# Patient Record
Sex: MALE | Race: WHITE | NOT HISPANIC OR LATINO | Employment: OTHER | URBAN - METROPOLITAN AREA
[De-identification: names, ages, dates, MRNs, and addresses within clinical notes are randomized per-mention and may not be internally consistent; named-entity substitution may affect disease eponyms.]

---

## 2023-04-04 LAB
MISCELLANEUOUS TEST RESULT: NORMAL
NAME OF SENDOUT TEST: NORMAL

## 2023-04-20 ENCOUNTER — TELEPHONE (OUTPATIENT)
Dept: PRIMARY CARE | Facility: CLINIC | Age: 63
End: 2023-04-20

## 2023-04-20 NOTE — TELEPHONE ENCOUNTER
He called he would like to know if we could order something he has a fever, dry cough, his pharmacy is [facility] in [place]. He did take a covid test and it was neg. Please call him if we order anything at [phone].

## 2023-05-30 PROBLEM — J30.2 SEASONAL ALLERGIES: Status: ACTIVE | Noted: 2023-05-30

## 2023-05-30 PROBLEM — H61.23 BILATERAL IMPACTED CERUMEN: Status: ACTIVE | Noted: 2023-05-30

## 2023-05-30 PROBLEM — J06.9 URI (UPPER RESPIRATORY INFECTION): Status: ACTIVE | Noted: 2023-05-30

## 2023-05-30 PROBLEM — J30.9 ALLERGIC RHINITIS: Status: ACTIVE | Noted: 2023-05-30

## 2023-05-30 PROBLEM — M06.9 RHEUMATOID ARTHRITIS (MULTI): Status: ACTIVE | Noted: 2023-05-30

## 2023-05-30 PROBLEM — E78.5 HYPERLIPEMIA: Status: ACTIVE | Noted: 2023-05-30

## 2023-05-30 PROBLEM — L30.9 ECZEMA: Status: ACTIVE | Noted: 2023-05-30

## 2023-05-30 PROBLEM — K64.9 HEMORRHOIDS: Status: ACTIVE | Noted: 2023-05-30

## 2023-05-30 PROBLEM — H60.90 OTITIS EXTERNA: Status: ACTIVE | Noted: 2023-05-30

## 2023-05-30 PROBLEM — R35.1 NOCTURIA: Status: ACTIVE | Noted: 2023-05-30

## 2023-05-30 PROBLEM — I10 BENIGN ESSENTIAL HYPERTENSION: Status: ACTIVE | Noted: 2023-05-30

## 2023-05-30 PROBLEM — L80 VITILIGO: Status: ACTIVE | Noted: 2023-05-30

## 2023-05-30 PROBLEM — E78.00 LOW-DENSITY-LIPOID-TYPE (LDL) HYPERLIPOPROTEINEMIA: Status: ACTIVE | Noted: 2023-05-30

## 2023-05-30 PROBLEM — U07.1 COVID-19: Status: ACTIVE | Noted: 2023-05-30

## 2023-05-30 RX ORDER — HYDROXYCHLOROQUINE SULFATE 200 MG/1
1 TABLET, FILM COATED ORAL EVERY OTHER DAY
COMMUNITY
End: 2023-11-13 | Stop reason: SDUPTHER

## 2023-05-30 RX ORDER — MINERAL OIL
1 ENEMA (ML) RECTAL DAILY PRN
COMMUNITY
End: 2024-02-28 | Stop reason: ALTCHOICE

## 2023-05-30 RX ORDER — IBUPROFEN 200 MG
200 TABLET ORAL
COMMUNITY
Start: 2022-05-31

## 2023-05-30 RX ORDER — FLUTICASONE PROPIONATE 50 MCG
2 SPRAY, SUSPENSION (ML) NASAL DAILY
COMMUNITY

## 2023-05-31 ENCOUNTER — OFFICE VISIT (OUTPATIENT)
Dept: PRIMARY CARE | Facility: CLINIC | Age: 63
End: 2023-05-31
Payer: COMMERCIAL

## 2023-05-31 VITALS
DIASTOLIC BLOOD PRESSURE: 84 MMHG | HEART RATE: 61 BPM | WEIGHT: 155 LBS | BODY MASS INDEX: 24.91 KG/M2 | SYSTOLIC BLOOD PRESSURE: 150 MMHG | HEIGHT: 66 IN

## 2023-05-31 DIAGNOSIS — Z76.89 ENCOUNTER TO ESTABLISH CARE: ICD-10-CM

## 2023-05-31 DIAGNOSIS — H61.23 BILATERAL IMPACTED CERUMEN: ICD-10-CM

## 2023-05-31 DIAGNOSIS — E78.00 LOW-DENSITY-LIPOID-TYPE (LDL) HYPERLIPOPROTEINEMIA: ICD-10-CM

## 2023-05-31 DIAGNOSIS — M06.9 RHEUMATOID ARTHRITIS, INVOLVING UNSPECIFIED SITE, UNSPECIFIED WHETHER RHEUMATOID FACTOR PRESENT (MULTI): ICD-10-CM

## 2023-05-31 DIAGNOSIS — E78.2 MIXED HYPERLIPIDEMIA: Primary | ICD-10-CM

## 2023-05-31 DIAGNOSIS — M54.2 NECK PAIN: ICD-10-CM

## 2023-05-31 DIAGNOSIS — I10 BENIGN ESSENTIAL HYPERTENSION: ICD-10-CM

## 2023-05-31 DIAGNOSIS — J30.2 SEASONAL ALLERGIES: ICD-10-CM

## 2023-05-31 DIAGNOSIS — Z12.5 PROSTATE CANCER SCREENING: ICD-10-CM

## 2023-05-31 PROBLEM — J06.9 URI (UPPER RESPIRATORY INFECTION): Status: RESOLVED | Noted: 2023-05-30 | Resolved: 2023-05-31

## 2023-05-31 PROBLEM — H60.90 OTITIS EXTERNA: Status: RESOLVED | Noted: 2023-05-30 | Resolved: 2023-05-31

## 2023-05-31 PROBLEM — U07.1 COVID-19: Status: RESOLVED | Noted: 2023-05-30 | Resolved: 2023-05-31

## 2023-05-31 PROCEDURE — 3079F DIAST BP 80-89 MM HG: CPT | Performed by: CLINICAL NURSE SPECIALIST

## 2023-05-31 PROCEDURE — 3077F SYST BP >= 140 MM HG: CPT | Performed by: CLINICAL NURSE SPECIALIST

## 2023-05-31 PROCEDURE — 99214 OFFICE O/P EST MOD 30 MIN: CPT | Performed by: CLINICAL NURSE SPECIALIST

## 2023-05-31 PROCEDURE — 1036F TOBACCO NON-USER: CPT | Performed by: CLINICAL NURSE SPECIALIST

## 2023-05-31 RX ORDER — LISINOPRIL 10 MG/1
10 TABLET ORAL DAILY
Qty: 30 TABLET | Refills: 5 | Status: SHIPPED | OUTPATIENT
Start: 2023-05-31 | End: 2023-07-05

## 2023-05-31 ASSESSMENT — ENCOUNTER SYMPTOMS
NECK PAIN: 1
CHILLS: 0
BRUISES/BLEEDS EASILY: 0
FATIGUE: 0
POLYDIPSIA: 0
COUGH: 0
FEVER: 0
APPETITE CHANGE: 0
UNEXPECTED WEIGHT CHANGE: 0
DIARRHEA: 0
PHOTOPHOBIA: 0
HEMATURIA: 0
DYSURIA: 0
TROUBLE SWALLOWING: 0
WOUND: 0
CHEST TIGHTNESS: 0
HEADACHES: 0
LOSS OF SENSATION IN FEET: 0
VOMITING: 0
FLANK PAIN: 0
JOINT SWELLING: 1
EYE PAIN: 0
SEIZURES: 0
SORE THROAT: 0
OCCASIONAL FEELINGS OF UNSTEADINESS: 0
WHEEZING: 0
NAUSEA: 0
ARTHRALGIAS: 1
CONSTIPATION: 0
DEPRESSION: 0
SHORTNESS OF BREATH: 0
SLEEP DISTURBANCE: 0
CONFUSION: 0
MYALGIAS: 0
ABDOMINAL PAIN: 0
BLOOD IN STOOL: 0
ACTIVITY CHANGE: 0
BACK PAIN: 0
DIZZINESS: 0
PALPITATIONS: 0

## 2023-05-31 ASSESSMENT — COLUMBIA-SUICIDE SEVERITY RATING SCALE - C-SSRS
2. HAVE YOU ACTUALLY HAD ANY THOUGHTS OF KILLING YOURSELF?: NO
1. IN THE PAST MONTH, HAVE YOU WISHED YOU WERE DEAD OR WISHED YOU COULD GO TO SLEEP AND NOT WAKE UP?: NO
6. HAVE YOU EVER DONE ANYTHING, STARTED TO DO ANYTHING, OR PREPARED TO DO ANYTHING TO END YOUR LIFE?: NO

## 2023-05-31 ASSESSMENT — PATIENT HEALTH QUESTIONNAIRE - PHQ9
SUM OF ALL RESPONSES TO PHQ9 QUESTIONS 1 AND 2: 0
2. FEELING DOWN, DEPRESSED OR HOPELESS: NOT AT ALL
1. LITTLE INTEREST OR PLEASURE IN DOING THINGS: NOT AT ALL

## 2023-05-31 NOTE — PROGRESS NOTES
"Subjective   Patient ID: Dylon Louise is a 62 y.o. male who presents for Annual Exam (Wellness exam/Transfer from [facility] ).  HPI    New patient here today to establish care.  Transfer care from [facility].      Following with Rheumatologist, Dr. Abraham. Had Vectra testing done, was recommended to take every other day. Diagnosed in 2011. Feels that his pain is controlled, improved \"achy joints.' Diagnosed with Gunn's Neuroma. Taking Ibuprofen OTC and well fitted shoes.     Taking Allegra, Flonase, and Ibuprofen PRN. Allergy testing in the past, indicated positive for Red Maple Pollen, worse with Dust Mites. Worse in the Spring/Fall. Otherwise, normally well controlled.     Hypertension, has not been on any medications in the past. Cautious about starting new medications. Has been trying a healthier diet, part meat/vegetarian diet. Diet/Exercise. B12. Men's Multivitamin to help.     Left ear. Feels that the ear is \"closed up.\" Yawning will noticed some improvement but feels that he needs to continually do. Not having the discomfort/drainage.     Right sided neck pain. Has tried stretching exercises. Aggravated after moving his son. Still feels tightness. Denies any radiation of pain. Denies any numbness/tingling.     Review of Systems   Constitutional:  Negative for activity change, appetite change, chills, fatigue, fever and unexpected weight change.   HENT:  Negative for ear pain, hearing loss, nosebleeds, sore throat, tinnitus and trouble swallowing.    Eyes:  Negative for photophobia, pain and visual disturbance.   Respiratory:  Negative for cough, chest tightness, shortness of breath and wheezing.    Cardiovascular:  Negative for chest pain, palpitations and leg swelling.   Gastrointestinal:  Negative for abdominal pain, blood in stool, constipation, diarrhea, nausea and vomiting.   Endocrine: Negative for cold intolerance, heat intolerance, polydipsia and polyuria.   Genitourinary:  " Negative for dysuria, flank pain and hematuria.   Musculoskeletal:  Positive for arthralgias, joint swelling and neck pain. Negative for back pain and myalgias.   Skin:  Negative for pallor, rash and wound.   Allergic/Immunologic: Negative for immunocompromised state.   Neurological:  Negative for dizziness, seizures and headaches.   Hematological:  Does not bruise/bleed easily.   Psychiatric/Behavioral:  Negative for confusion and sleep disturbance.        Objective   Physical Exam  Vitals and nursing note reviewed.   Constitutional:       General: He is not in acute distress.     Appearance: Normal appearance.   HENT:      Head: Normocephalic.      Nose: Nose normal.   Eyes:      Conjunctiva/sclera: Conjunctivae normal.   Neck:      Vascular: No carotid bruit.   Cardiovascular:      Rate and Rhythm: Normal rate and regular rhythm.      Pulses: Normal pulses.      Heart sounds: Normal heart sounds.   Pulmonary:      Effort: Pulmonary effort is normal.      Breath sounds: Normal breath sounds.   Abdominal:      General: Bowel sounds are normal.      Palpations: Abdomen is soft.   Musculoskeletal:         General: Normal range of motion.      Cervical back: Normal range of motion.   Skin:     General: Skin is warm and dry.   Neurological:      Mental Status: He is alert and oriented to person, place, and time. Mental status is at baseline.   Psychiatric:         Mood and Affect: Mood normal.         Behavior: Behavior normal.       Assessment/Plan       New order for blood work provided for patient today.     Hypertension: Blood pressure uncontrolled at OV today. Working on dietary changes with minimal improvement. Lisinopril Follow up in 1 month.   Elevated LDL: Updated lab work ordered. CT Cardiac Scoring ordered.   RA: Continue Plaquenil.  Following with Rheumatologist.   Cerumen Impaction: Irrigation performed at OV today.   Neck Pain: Discussed warm compresses, massage, stretching exercises. Reevaluate at follow  up appointment.   Allergies: Controlled with OTC medications. Continue to monitor.      Colonoscopy: December 2014, plan to repeat in 10 years.   Prostate Screening ordered.   Tdap: June 2016.   Shingrix: September 2020, January 2021.   COVID: September 2022.

## 2023-06-01 ENCOUNTER — LAB (OUTPATIENT)
Dept: LAB | Facility: LAB | Age: 63
End: 2023-06-01
Payer: COMMERCIAL

## 2023-06-01 DIAGNOSIS — M06.9 RHEUMATOID ARTHRITIS, INVOLVING UNSPECIFIED SITE, UNSPECIFIED WHETHER RHEUMATOID FACTOR PRESENT (MULTI): ICD-10-CM

## 2023-06-01 DIAGNOSIS — E78.2 MIXED HYPERLIPIDEMIA: ICD-10-CM

## 2023-06-01 DIAGNOSIS — I10 BENIGN ESSENTIAL HYPERTENSION: ICD-10-CM

## 2023-06-01 DIAGNOSIS — Z12.5 PROSTATE CANCER SCREENING: ICD-10-CM

## 2023-06-01 LAB
ALANINE AMINOTRANSFERASE (SGPT) (U/L) IN SER/PLAS: 21 U/L (ref 10–52)
ALBUMIN (G/DL) IN SER/PLAS: 3.9 G/DL (ref 3.4–5)
ALKALINE PHOSPHATASE (U/L) IN SER/PLAS: 94 U/L (ref 33–136)
ANION GAP IN SER/PLAS: 10 MMOL/L (ref 10–20)
ASPARTATE AMINOTRANSFERASE (SGOT) (U/L) IN SER/PLAS: 22 U/L (ref 9–39)
BILIRUBIN TOTAL (MG/DL) IN SER/PLAS: 0.8 MG/DL (ref 0–1.2)
CALCIUM (MG/DL) IN SER/PLAS: 9.4 MG/DL (ref 8.6–10.3)
CARBON DIOXIDE, TOTAL (MMOL/L) IN SER/PLAS: 32 MMOL/L (ref 21–32)
CHLORIDE (MMOL/L) IN SER/PLAS: 104 MMOL/L (ref 98–107)
CHOLESTEROL (MG/DL) IN SER/PLAS: 163 MG/DL (ref 0–199)
CHOLESTEROL IN HDL (MG/DL) IN SER/PLAS: 39.3 MG/DL
CHOLESTEROL/HDL RATIO: 4.1
COBALAMIN (VITAMIN B12) (PG/ML) IN SER/PLAS: 827 PG/ML (ref 211–911)
CREATININE (MG/DL) IN SER/PLAS: 0.95 MG/DL (ref 0.5–1.3)
ERYTHROCYTE DISTRIBUTION WIDTH (RATIO) BY AUTOMATED COUNT: 12.9 % (ref 11.5–14.5)
ERYTHROCYTE MEAN CORPUSCULAR HEMOGLOBIN CONCENTRATION (G/DL) BY AUTOMATED: 33.3 G/DL (ref 32–36)
ERYTHROCYTE MEAN CORPUSCULAR VOLUME (FL) BY AUTOMATED COUNT: 87 FL (ref 80–100)
ERYTHROCYTES (10*6/UL) IN BLOOD BY AUTOMATED COUNT: 5.1 X10E12/L (ref 4.5–5.9)
GFR MALE: 90 ML/MIN/1.73M2
GLUCOSE (MG/DL) IN SER/PLAS: 81 MG/DL (ref 74–99)
HEMATOCRIT (%) IN BLOOD BY AUTOMATED COUNT: 44.4 % (ref 41–52)
HEMOGLOBIN (G/DL) IN BLOOD: 14.8 G/DL (ref 13.5–17.5)
LDL: 103 MG/DL (ref 0–99)
LEUKOCYTES (10*3/UL) IN BLOOD BY AUTOMATED COUNT: 5.8 X10E9/L (ref 4.4–11.3)
PLATELETS (10*3/UL) IN BLOOD AUTOMATED COUNT: 191 X10E9/L (ref 150–450)
POTASSIUM (MMOL/L) IN SER/PLAS: 3.9 MMOL/L (ref 3.5–5.3)
PROSTATE SPECIFIC ANTIGEN,SCREEN: 1.19 NG/ML (ref 0–4)
PROTEIN TOTAL: 6.4 G/DL (ref 6.4–8.2)
SODIUM (MMOL/L) IN SER/PLAS: 142 MMOL/L (ref 136–145)
THYROTROPIN (MIU/L) IN SER/PLAS BY DETECTION LIMIT <= 0.05 MIU/L: 3.34 MIU/L (ref 0.44–3.98)
TRIGLYCERIDE (MG/DL) IN SER/PLAS: 103 MG/DL (ref 0–149)
UREA NITROGEN (MG/DL) IN SER/PLAS: 12 MG/DL (ref 6–23)
VLDL: 21 MG/DL (ref 0–40)

## 2023-06-01 PROCEDURE — 84153 ASSAY OF PSA TOTAL: CPT

## 2023-06-01 PROCEDURE — 84443 ASSAY THYROID STIM HORMONE: CPT

## 2023-06-01 PROCEDURE — 36415 COLL VENOUS BLD VENIPUNCTURE: CPT

## 2023-06-01 PROCEDURE — 85027 COMPLETE CBC AUTOMATED: CPT

## 2023-06-01 PROCEDURE — 80061 LIPID PANEL: CPT

## 2023-06-01 PROCEDURE — 80053 COMPREHEN METABOLIC PANEL: CPT

## 2023-06-01 PROCEDURE — 82607 VITAMIN B-12: CPT

## 2023-06-05 ENCOUNTER — TELEPHONE (OUTPATIENT)
Dept: PRIMARY CARE | Facility: CLINIC | Age: 63
End: 2023-06-05

## 2023-06-05 DIAGNOSIS — I10 BENIGN ESSENTIAL HYPERTENSION: ICD-10-CM

## 2023-06-05 NOTE — TELEPHONE ENCOUNTER
----- Message from RUBIO Bravo-CNS sent at 6/2/2023 10:53 PM EDT -----  Please let patient know that his blood work is controlled. No changes needed at this time. Plan to repeat CMP prior to follow up appointment. Thank you!

## 2023-06-13 ENCOUNTER — LAB (OUTPATIENT)
Dept: LAB | Facility: LAB | Age: 63
End: 2023-06-13
Payer: COMMERCIAL

## 2023-06-13 DIAGNOSIS — I10 BENIGN ESSENTIAL HYPERTENSION: ICD-10-CM

## 2023-06-13 LAB
ALANINE AMINOTRANSFERASE (SGPT) (U/L) IN SER/PLAS: 23 U/L (ref 10–52)
ALBUMIN (G/DL) IN SER/PLAS: 4.2 G/DL (ref 3.4–5)
ALKALINE PHOSPHATASE (U/L) IN SER/PLAS: 90 U/L (ref 33–136)
ANION GAP IN SER/PLAS: 10 MMOL/L (ref 10–20)
ASPARTATE AMINOTRANSFERASE (SGOT) (U/L) IN SER/PLAS: 23 U/L (ref 9–39)
BILIRUBIN TOTAL (MG/DL) IN SER/PLAS: 0.9 MG/DL (ref 0–1.2)
CALCIUM (MG/DL) IN SER/PLAS: 9.3 MG/DL (ref 8.6–10.3)
CARBON DIOXIDE, TOTAL (MMOL/L) IN SER/PLAS: 31 MMOL/L (ref 21–32)
CHLORIDE (MMOL/L) IN SER/PLAS: 106 MMOL/L (ref 98–107)
CREATININE (MG/DL) IN SER/PLAS: 0.84 MG/DL (ref 0.5–1.3)
GFR MALE: >90 ML/MIN/1.73M2
GLUCOSE (MG/DL) IN SER/PLAS: 104 MG/DL (ref 74–99)
POTASSIUM (MMOL/L) IN SER/PLAS: 4.2 MMOL/L (ref 3.5–5.3)
PROTEIN TOTAL: 6.5 G/DL (ref 6.4–8.2)
SODIUM (MMOL/L) IN SER/PLAS: 143 MMOL/L (ref 136–145)
UREA NITROGEN (MG/DL) IN SER/PLAS: 14 MG/DL (ref 6–23)

## 2023-06-13 PROCEDURE — 36415 COLL VENOUS BLD VENIPUNCTURE: CPT

## 2023-06-13 PROCEDURE — 80053 COMPREHEN METABOLIC PANEL: CPT

## 2023-06-13 NOTE — TELEPHONE ENCOUNTER
----- Message from RUBIO Bravo-CNS sent at 6/12/2023 10:53 PM EDT -----  Please let patient know that CT Cardiac Scoring is 0. Incidental findings he will see on the Portal we can discuss at follow up appointment further. Thank you!

## 2023-07-05 ENCOUNTER — OFFICE VISIT (OUTPATIENT)
Dept: PRIMARY CARE | Facility: CLINIC | Age: 63
End: 2023-07-05
Payer: COMMERCIAL

## 2023-07-05 VITALS
WEIGHT: 158 LBS | HEIGHT: 66 IN | DIASTOLIC BLOOD PRESSURE: 84 MMHG | HEART RATE: 61 BPM | BODY MASS INDEX: 25.39 KG/M2 | SYSTOLIC BLOOD PRESSURE: 150 MMHG

## 2023-07-05 DIAGNOSIS — I10 BENIGN ESSENTIAL HYPERTENSION: ICD-10-CM

## 2023-07-05 DIAGNOSIS — Z12.5 PROSTATE CANCER SCREENING: ICD-10-CM

## 2023-07-05 DIAGNOSIS — R93.1 ABNORMAL HEART SCORE CT: ICD-10-CM

## 2023-07-05 DIAGNOSIS — M06.9 RHEUMATOID ARTHRITIS, INVOLVING UNSPECIFIED SITE, UNSPECIFIED WHETHER RHEUMATOID FACTOR PRESENT (MULTI): ICD-10-CM

## 2023-07-05 DIAGNOSIS — J30.9 ALLERGIC RHINITIS, UNSPECIFIED SEASONALITY, UNSPECIFIED TRIGGER: Primary | ICD-10-CM

## 2023-07-05 DIAGNOSIS — E78.2 MIXED HYPERLIPIDEMIA: ICD-10-CM

## 2023-07-05 PROBLEM — H61.23 BILATERAL IMPACTED CERUMEN: Status: RESOLVED | Noted: 2023-05-30 | Resolved: 2023-07-05

## 2023-07-05 PROCEDURE — 3077F SYST BP >= 140 MM HG: CPT | Performed by: CLINICAL NURSE SPECIALIST

## 2023-07-05 PROCEDURE — 3079F DIAST BP 80-89 MM HG: CPT | Performed by: CLINICAL NURSE SPECIALIST

## 2023-07-05 PROCEDURE — 99214 OFFICE O/P EST MOD 30 MIN: CPT | Performed by: CLINICAL NURSE SPECIALIST

## 2023-07-05 PROCEDURE — 1036F TOBACCO NON-USER: CPT | Performed by: CLINICAL NURSE SPECIALIST

## 2023-07-05 RX ORDER — LISINOPRIL 20 MG/1
20 TABLET ORAL DAILY
Qty: 90 TABLET | Refills: 3 | Status: SHIPPED | OUTPATIENT
Start: 2023-07-05 | End: 2023-07-06 | Stop reason: SDUPTHER

## 2023-07-05 RX ORDER — LISINOPRIL 20 MG/1
20 TABLET ORAL DAILY
Qty: 30 TABLET | Refills: 11 | Status: SHIPPED | OUTPATIENT
Start: 2023-07-05 | End: 2023-07-05 | Stop reason: SDUPTHER

## 2023-07-05 ASSESSMENT — ENCOUNTER SYMPTOMS
DEPRESSION: 0
BLOOD IN STOOL: 0
FATIGUE: 0
CHILLS: 0
CHEST TIGHTNESS: 0
BACK PAIN: 0
APPETITE CHANGE: 0
POLYDIPSIA: 0
ABDOMINAL PAIN: 0
CONSTIPATION: 0
VOMITING: 0
COUGH: 0
SHORTNESS OF BREATH: 0
BRUISES/BLEEDS EASILY: 0
NECK PAIN: 0
ARTHRALGIAS: 1
TROUBLE SWALLOWING: 0
FLANK PAIN: 0
HEMATURIA: 0
SORE THROAT: 0
MYALGIAS: 0
SLEEP DISTURBANCE: 0
PHOTOPHOBIA: 0
EYE PAIN: 0
HEADACHES: 0
LOSS OF SENSATION IN FEET: 0
FEVER: 0
UNEXPECTED WEIGHT CHANGE: 0
JOINT SWELLING: 0
WHEEZING: 0
PALPITATIONS: 0
OCCASIONAL FEELINGS OF UNSTEADINESS: 0
DIZZINESS: 0
DYSURIA: 0
CONFUSION: 0
SEIZURES: 0
NAUSEA: 0
WOUND: 0
DIARRHEA: 0
ACTIVITY CHANGE: 0

## 2023-07-05 ASSESSMENT — COLUMBIA-SUICIDE SEVERITY RATING SCALE - C-SSRS
2. HAVE YOU ACTUALLY HAD ANY THOUGHTS OF KILLING YOURSELF?: NO
6. HAVE YOU EVER DONE ANYTHING, STARTED TO DO ANYTHING, OR PREPARED TO DO ANYTHING TO END YOUR LIFE?: NO
1. IN THE PAST MONTH, HAVE YOU WISHED YOU WERE DEAD OR WISHED YOU COULD GO TO SLEEP AND NOT WAKE UP?: NO

## 2023-07-05 ASSESSMENT — PATIENT HEALTH QUESTIONNAIRE - PHQ9
2. FEELING DOWN, DEPRESSED OR HOPELESS: NOT AT ALL
SUM OF ALL RESPONSES TO PHQ9 QUESTIONS 1 AND 2: 0
1. LITTLE INTEREST OR PLEASURE IN DOING THINGS: NOT AT ALL

## 2023-07-05 NOTE — PROGRESS NOTES
"Subjective   Patient ID: Dylon Louise is a 62 y.o. male who presents for Follow-up (Follow up htn /Review CT scan and discuss feet).  HPI    Here today as a follow up appointment.     Following with Rheumatologist, Dr. Abraham. Had Vectra testing done, was recommended to take every other day. Diagnosed in 2011. Feels that his pain is controlled, improved \"achy joints.' Diagnosed with Gunn's Neuroma. Taking Ibuprofen OTC and well fitted shoes. Complaints of worsening pain in his right foot. Planning to consider intervention. Considering a Specialist for evaluation.      Taking Allegra, Flonase, and Ibuprofen PRN. Allergy testing in the past, indicated positive for Red Maple Pollen, worse with Dust Mites. Worse in the Spring/Fall. Otherwise, normally well controlled.      Hypertension, started on Lisinopril after last OV. Tolerating medication. Some improvement but blood pressure has remained elevated. Has been trying a healthier diet, part meat/vegetarian diet. Diet/Exercise. B12. Men's Multivitamin to help.      Right sided neck pain. Has tried stretching exercises. Aggravated after moving his son. Still feels tightness. Denies any radiation of pain. Denies any numbness/tingling.     Review of Systems   Constitutional:  Negative for activity change, appetite change, chills, fatigue, fever and unexpected weight change.   HENT:  Negative for ear pain, hearing loss, nosebleeds, sore throat, tinnitus and trouble swallowing.    Eyes:  Negative for photophobia, pain and visual disturbance.   Respiratory:  Negative for cough, chest tightness, shortness of breath and wheezing.    Cardiovascular:  Negative for chest pain, palpitations and leg swelling.   Gastrointestinal:  Negative for abdominal pain, blood in stool, constipation, diarrhea, nausea and vomiting.   Endocrine: Negative for cold intolerance, heat intolerance, polydipsia and polyuria.   Genitourinary:  Negative for dysuria, flank pain and hematuria. "   Musculoskeletal:  Positive for arthralgias. Negative for back pain, joint swelling, myalgias and neck pain.   Skin:  Negative for pallor, rash and wound.   Allergic/Immunologic: Negative for immunocompromised state.   Neurological:  Negative for dizziness, seizures and headaches.   Hematological:  Does not bruise/bleed easily.   Psychiatric/Behavioral:  Negative for confusion and sleep disturbance.        Objective   Physical Exam  Constitutional:       General: He is not in acute distress.     Appearance: Normal appearance.   HENT:      Head: Normocephalic.      Nose: Nose normal.   Eyes:      Conjunctiva/sclera: Conjunctivae normal.   Neck:      Vascular: No carotid bruit.   Cardiovascular:      Rate and Rhythm: Normal rate and regular rhythm.      Pulses: Normal pulses.      Heart sounds: Normal heart sounds.   Pulmonary:      Effort: Pulmonary effort is normal.      Breath sounds: Normal breath sounds.   Abdominal:      General: Bowel sounds are normal.      Palpations: Abdomen is soft.   Musculoskeletal:         General: Normal range of motion.      Cervical back: Normal range of motion.   Skin:     General: Skin is warm and dry.   Neurological:      Mental Status: He is alert and oriented to person, place, and time. Mental status is at baseline.   Psychiatric:         Mood and Affect: Mood normal.         Behavior: Behavior normal.         Assessment/Plan       Reviewed results of blood work completed with patient.      Hypertension: Blood pressure uncontrolled at OV today. Working on dietary changes with minimal improvement. Increase Lisinopril Follow up in 1 month.   Elevated LDL: CT Cardiac Scoring: June 2023, Score 0. Lifestyle changes as noted.   RA: Continue Plaquenil.  Following with Rheumatologist.   Neck Pain: Discussed warm compresses, massage, stretching exercises. Reevaluate at follow up appointment.   Allergies: Controlled with OTC medications. Continue to monitor.   Abnormal CT: Follow up  imaging ordered.     Colonoscopy: December 2014, plan to repeat in 10 years.   Prostate Screening: June 2023.   Tdap: June 2016.   Shingrix: September 2020, January 2021.   COVID: September 2022. Discussed Booster.

## 2023-07-06 ENCOUNTER — TELEPHONE (OUTPATIENT)
Dept: PRIMARY CARE | Facility: CLINIC | Age: 63
End: 2023-07-06

## 2023-07-06 DIAGNOSIS — I10 BENIGN ESSENTIAL HYPERTENSION: ICD-10-CM

## 2023-07-06 RX ORDER — LISINOPRIL 20 MG/1
20 TABLET ORAL DAILY
Qty: 14 TABLET | Refills: 0 | Status: SHIPPED | OUTPATIENT
Start: 2023-07-06 | End: 2023-08-24

## 2023-07-06 RX ORDER — LISINOPRIL 20 MG/1
20 TABLET ORAL DAILY
Qty: 30 TABLET | Refills: 0 | Status: SHIPPED | OUTPATIENT
Start: 2023-07-06 | End: 2023-07-06 | Stop reason: SDUPTHER

## 2023-07-06 NOTE — TELEPHONE ENCOUNTER
He called back cause his Lisinopril got cancel that was to be sent to [facility] on [facility] could we please resend it over for just a 30 day his mail order is to come on the 7/14

## 2023-07-24 ENCOUNTER — TELEPHONE (OUTPATIENT)
Dept: PRIMARY CARE | Facility: CLINIC | Age: 63
End: 2023-07-24

## 2023-07-24 NOTE — TELEPHONE ENCOUNTER
----- Message from RUBIO Bravo-CNS sent at 7/23/2023  9:28 PM EDT -----  Please let patient know his CT looks stable. Can discuss further at follow up appointment next month. Thank you!

## 2023-08-24 ENCOUNTER — OFFICE VISIT (OUTPATIENT)
Dept: PRIMARY CARE | Facility: CLINIC | Age: 63
End: 2023-08-24
Payer: COMMERCIAL

## 2023-08-24 VITALS
SYSTOLIC BLOOD PRESSURE: 124 MMHG | HEART RATE: 57 BPM | DIASTOLIC BLOOD PRESSURE: 90 MMHG | BODY MASS INDEX: 25.23 KG/M2 | HEIGHT: 66 IN | WEIGHT: 157 LBS

## 2023-08-24 DIAGNOSIS — E78.2 MIXED HYPERLIPIDEMIA: Primary | ICD-10-CM

## 2023-08-24 DIAGNOSIS — I10 BENIGN ESSENTIAL HYPERTENSION: ICD-10-CM

## 2023-08-24 PROCEDURE — 3074F SYST BP LT 130 MM HG: CPT | Performed by: CLINICAL NURSE SPECIALIST

## 2023-08-24 PROCEDURE — 3080F DIAST BP >= 90 MM HG: CPT | Performed by: CLINICAL NURSE SPECIALIST

## 2023-08-24 PROCEDURE — 99214 OFFICE O/P EST MOD 30 MIN: CPT | Performed by: CLINICAL NURSE SPECIALIST

## 2023-08-24 PROCEDURE — 1036F TOBACCO NON-USER: CPT | Performed by: CLINICAL NURSE SPECIALIST

## 2023-08-24 RX ORDER — LISINOPRIL 40 MG/1
40 TABLET ORAL DAILY
Qty: 90 TABLET | Refills: 3 | Status: SHIPPED | OUTPATIENT
Start: 2023-08-24 | End: 2024-04-17 | Stop reason: SDUPTHER

## 2023-08-24 ASSESSMENT — ENCOUNTER SYMPTOMS
CHEST TIGHTNESS: 0
BACK PAIN: 0
FLANK PAIN: 0
ACTIVITY CHANGE: 0
WHEEZING: 0
OCCASIONAL FEELINGS OF UNSTEADINESS: 0
NECK PAIN: 0
APPETITE CHANGE: 0
FEVER: 0
JOINT SWELLING: 0
WOUND: 0
CHILLS: 0
DIARRHEA: 0
SORE THROAT: 0
ARTHRALGIAS: 1
DIZZINESS: 0
COUGH: 0
NAUSEA: 0
VOMITING: 0
UNEXPECTED WEIGHT CHANGE: 0
ABDOMINAL PAIN: 0
DEPRESSION: 0
POLYDIPSIA: 0
TROUBLE SWALLOWING: 0
HEMATURIA: 0
PALPITATIONS: 0
CONFUSION: 0
BRUISES/BLEEDS EASILY: 0
FATIGUE: 0
HEADACHES: 0
BLOOD IN STOOL: 0
SEIZURES: 0
MYALGIAS: 0
LOSS OF SENSATION IN FEET: 0
PHOTOPHOBIA: 0
SLEEP DISTURBANCE: 0
CONSTIPATION: 0
DYSURIA: 0
SHORTNESS OF BREATH: 0
EYE PAIN: 0

## 2023-08-24 ASSESSMENT — COLUMBIA-SUICIDE SEVERITY RATING SCALE - C-SSRS
6. HAVE YOU EVER DONE ANYTHING, STARTED TO DO ANYTHING, OR PREPARED TO DO ANYTHING TO END YOUR LIFE?: NO
1. IN THE PAST MONTH, HAVE YOU WISHED YOU WERE DEAD OR WISHED YOU COULD GO TO SLEEP AND NOT WAKE UP?: NO
2. HAVE YOU ACTUALLY HAD ANY THOUGHTS OF KILLING YOURSELF?: NO

## 2023-08-24 NOTE — PROGRESS NOTES
"Subjective   Patient ID: Dylon Louise is a 63 y.o. male who presents for Follow-up (1 month follow up htn ).  HPI    Here today as a follow up appointment.      Following with Rheumatologist, Dr. Abraham. Had Vectra testing done, was recommended to take every other day. Diagnosed in 2011. Feels that his pain is controlled, improved \"achy joints.' Diagnosed with Gunn's Neuroma. Taking Ibuprofen OTC and well fitted shoes. Complaints of worsening pain in his right foot. Planning to consider intervention. Now following with Dr. Good for management, planning Orthotics.      Taking Allegra, Flonase, and Ibuprofen PRN. Allergy testing in the past, indicated positive for Red Maple Pollen, worse with Dust Mites. Worse in the Spring/Fall. Otherwise, normally well controlled.      Hypertension, started on Lisinopril after previous OV, increased. Tolerating medication. Some improvement but blood pressure has remained elevated. Has been trying a healthier diet, part meat/vegetarian diet. Diet/Exercise. B12. Men's Multivitamin to help.      Review of Systems   Constitutional:  Negative for activity change, appetite change, chills, fatigue, fever and unexpected weight change.   HENT:  Negative for ear pain, hearing loss, nosebleeds, sore throat, tinnitus and trouble swallowing.    Eyes:  Negative for photophobia, pain and visual disturbance.   Respiratory:  Negative for cough, chest tightness, shortness of breath and wheezing.    Cardiovascular:  Negative for chest pain, palpitations and leg swelling.   Gastrointestinal:  Negative for abdominal pain, blood in stool, constipation, diarrhea, nausea and vomiting.   Endocrine: Negative for cold intolerance, heat intolerance, polydipsia and polyuria.   Genitourinary:  Negative for dysuria, flank pain and hematuria.   Musculoskeletal:  Positive for arthralgias. Negative for back pain, joint swelling, myalgias and neck pain.   Skin:  Negative for pallor, rash and wound. "   Allergic/Immunologic: Negative for immunocompromised state.   Neurological:  Negative for dizziness, seizures and headaches.   Hematological:  Does not bruise/bleed easily.   Psychiatric/Behavioral:  Negative for confusion and sleep disturbance.        Objective   Physical Exam  Vitals and nursing note reviewed.   Constitutional:       General: He is not in acute distress.     Appearance: Normal appearance.   HENT:      Head: Normocephalic.      Nose: Nose normal.   Eyes:      Conjunctiva/sclera: Conjunctivae normal.   Neck:      Vascular: No carotid bruit.   Cardiovascular:      Rate and Rhythm: Normal rate and regular rhythm.      Pulses: Normal pulses.      Heart sounds: Normal heart sounds.   Pulmonary:      Effort: Pulmonary effort is normal.      Breath sounds: Normal breath sounds.   Abdominal:      General: Bowel sounds are normal.      Palpations: Abdomen is soft.   Musculoskeletal:         General: Normal range of motion.      Cervical back: Normal range of motion.   Skin:     General: Skin is warm and dry.   Neurological:      Mental Status: He is alert and oriented to person, place, and time. Mental status is at baseline.   Psychiatric:         Mood and Affect: Mood normal.         Behavior: Behavior normal.       Assessment/Plan        Reviewed results of blood work completed with patient. Follow up CT.      Hypertension: Blood pressure uncontrolled at OV today. Working on dietary changes with minimal improvement. Increase Lisinopril. Reevaluate at follow up appointment.   Elevated LDL: CT Cardiac Scoring: June 2023, Score 0. Lifestyle changes as noted.   RA: Continue Plaquenil.  Following with Rheumatologist.   Neck Pain: Discussed warm compresses, massage, stretching exercises. Reevaluate at follow up appointment.   Allergies: Controlled with OTC medications. Continue to monitor.      Colonoscopy: December 2014, plan to repeat in 10 years.   Prostate Screening: June 2023.   Tdap: June 2016.    Shingrix: September 2020, January 2021.   COVID: September 2022. Discussed Booster.

## 2023-09-09 DIAGNOSIS — U07.1 COVID-19: Primary | ICD-10-CM

## 2023-09-09 RX ORDER — NIRMATRELVIR AND RITONAVIR 300-100 MG
3 KIT ORAL 2 TIMES DAILY
Qty: 30 TABLET | Refills: 0 | Status: SHIPPED | OUTPATIENT
Start: 2023-09-09 | End: 2023-09-14

## 2023-10-25 ENCOUNTER — OFFICE VISIT (OUTPATIENT)
Dept: RHEUMATOLOGY | Facility: CLINIC | Age: 63
End: 2023-10-25
Payer: COMMERCIAL

## 2023-10-25 VITALS
HEART RATE: 62 BPM | WEIGHT: 156 LBS | DIASTOLIC BLOOD PRESSURE: 87 MMHG | BODY MASS INDEX: 25.07 KG/M2 | HEIGHT: 66 IN | SYSTOLIC BLOOD PRESSURE: 159 MMHG

## 2023-10-25 DIAGNOSIS — M05.9 RHEUMATOID ARTHRITIS WITH POSITIVE RHEUMATOID FACTOR, INVOLVING UNSPECIFIED SITE (MULTI): Primary | ICD-10-CM

## 2023-10-25 DIAGNOSIS — M81.0 AGE RELATED OSTEOPOROSIS, UNSPECIFIED PATHOLOGICAL FRACTURE PRESENCE: ICD-10-CM

## 2023-10-25 PROCEDURE — 3079F DIAST BP 80-89 MM HG: CPT | Performed by: INTERNAL MEDICINE

## 2023-10-25 PROCEDURE — 99214 OFFICE O/P EST MOD 30 MIN: CPT | Performed by: INTERNAL MEDICINE

## 2023-10-25 PROCEDURE — 1036F TOBACCO NON-USER: CPT | Performed by: INTERNAL MEDICINE

## 2023-10-25 PROCEDURE — 3077F SYST BP >= 140 MM HG: CPT | Performed by: INTERNAL MEDICINE

## 2023-10-25 NOTE — PROGRESS NOTES
Follow-up Rheumatology Patient Visit    Chief Complaint:  Dylon Louise is a 63 y.o. male presenting today for Follow-up.    History of Presenting Problem:   64 y/o male with Hx of seropositive RA diagnosed in 2011 on PLQ present for evaluation. He recalled having a lot of swelling in his hands/feet and fatigue. He was found to have +CC and RF. He was started on PLQ and he responded very well. Since has been on Plaquenil he has minimal joint complaints he has very few joint exacerbations, at last visit he was interested in discussing whether he is in disease remission.  Vectra score March 2023 was 21, so He has been on every other day  mg.   He recently got insert for his feet which improved the pain, left foot was though to be related reynolds's neuroma.    Problem List:   Patient Active Problem List   Diagnosis    Allergic rhinitis    Benign essential hypertension    Eczema    Hemorrhoids    Hyperlipemia    Low-density-lipoid-type (LDL) hyperlipoproteinemia    Nocturia    Rheumatoid arthritis (CMS/HCC)    Seasonal allergies    Vitiligo    Neck pain       Past Medical History:   Past Medical History:   Diagnosis Date    Allergic     Arthritis     Eczema     Hypertension     Other conditions influencing health status     No significant past medical history       Surgical History: No past surgical history on file.     Allergies:   Allergies   Allergen Reactions    Azithromycin Unknown    Pollen Extracts Unknown    Tobramycin Other and Rash       Medications:   Current Outpatient Medications:     fexofenadine (Allegra) 180 mg tablet, Take 1 tablet (180 mg) by mouth once daily as needed., Disp: , Rfl:     fluticasone (Flonase) 50 mcg/actuation nasal spray, Administer 2 sprays into each nostril once daily., Disp: , Rfl:     hydroxychloroquine (Plaquenil) 200 mg tablet, Take 1 tablet (200 mg) by mouth every other day., Disp: , Rfl:     ibuprofen 200 mg tablet, Take 1 tablet (200 mg) by mouth., Disp: , Rfl:      "lisinopril 40 mg tablet, Take 1 tablet (40 mg) by mouth once daily., Disp: 90 tablet, Rfl: 3      Objective   Physical Examination:    Visit Vitals  /87 (BP Location: Left arm, Patient Position: Sitting)   Pulse 62   Ht 1.676 m (5' 6\")   Wt 70.8 kg (156 lb)   BMI 25.18 kg/m²   Smoking Status Never   BSA 1.82 m²        Gen: NAD, A&O x 3  HEENT: clear sclera and conjunctiva,     Musculoskeletal:   Neck; WNL, full ROM  Shoulder: WNL, full ROM  Elbow:WNL, full ROM, no effusion noted  Wrist and fingers;no active synovitis noted, Full ROM in the Wrist , Good fist and   Knees:  No effusions or crepitation, full ROM.  Hips; WNL, full ROM, Negative Parker test  Ankle, Feet; WNL, full ROM    Skin: No rashes or lesions seen, no nail changes  Neuro: A&O x3, Normal Gait    Procedures :None    Orders:  Orders Placed This Encounter   Procedures    XR DEXA bone density        Provider Impression:   Assessment/Plan   Encounter Diagnoses   Name Primary?    Rheumatoid arthritis with positive rheumatoid factor, involving unspecified site (CMS/[facility]) Yes    Age related osteoporosis, unspecified pathological fracture presence         62 y/o male with Hx of seropositive RA diagnosed in 2011 on PLQ present for evaluation. Patient reports he has minimal disease activity on chronic hydroxychloroquine therapy and is interested in weaning off therapy. We discussed the risk and benefit of treatment and also going treatment.  -Conitnue with PLQ for now, patient is up-to-date with retinal eye exam  -Recommend calcium and Vitamin d supplement. Would recommend DEXA scan given the risk of osteoporosis and rheumatoid arthritis  -Follow-up in 6 months     "

## 2023-11-13 DIAGNOSIS — M05.9 RHEUMATOID ARTHRITIS WITH POSITIVE RHEUMATOID FACTOR, INVOLVING UNSPECIFIED SITE (MULTI): Primary | ICD-10-CM

## 2023-11-13 RX ORDER — HYDROXYCHLOROQUINE SULFATE 200 MG/1
200 TABLET, FILM COATED ORAL EVERY OTHER DAY
Qty: 45 TABLET | Refills: 0 | Status: SHIPPED | OUTPATIENT
Start: 2023-11-13 | End: 2024-02-11

## 2023-11-14 ENCOUNTER — OFFICE VISIT (OUTPATIENT)
Dept: ORTHOPEDIC SURGERY | Facility: CLINIC | Age: 63
End: 2023-11-14
Payer: COMMERCIAL

## 2023-11-14 VITALS — BODY MASS INDEX: 25.07 KG/M2 | WEIGHT: 156 LBS | HEIGHT: 66 IN

## 2023-11-14 DIAGNOSIS — M77.41 METATARSALGIA OF RIGHT FOOT: Primary | ICD-10-CM

## 2023-11-14 DIAGNOSIS — M77.42 METATARSALGIA OF LEFT FOOT: ICD-10-CM

## 2023-11-14 PROCEDURE — 99214 OFFICE O/P EST MOD 30 MIN: CPT | Performed by: SPECIALIST

## 2023-11-14 PROCEDURE — 3077F SYST BP >= 140 MM HG: CPT | Performed by: SPECIALIST

## 2023-11-14 PROCEDURE — 1036F TOBACCO NON-USER: CPT | Performed by: SPECIALIST

## 2023-11-14 PROCEDURE — 3079F DIAST BP 80-89 MM HG: CPT | Performed by: SPECIALIST

## 2023-11-14 NOTE — PROGRESS NOTES
Follow up bilateral foot pain.  Has been wearing orthotics for over a month, states by the end of the day his feet do not hurt nearly as bad as they did before.   They help make his pain more manageable.      Has also been seeing Rheumatology for his RA.       History of Present Illness: As above    on examination of the bilateral ankle/foot:  Gait: Normal  Alignment: Minimal early claw toes  Swelling: Minimal forefoot swelling  Ecchymosis: None  Erythema: None  Skin intact; no ulcers or lesions.   ROM in  ankle full  Normal ROM in 2-5th toe flexion/extension and 1st MTP flexion/extension  Normal strength in ankle plantarflexion, dorsiflexion, inversion and eversion; normal strength with great toe flexion/extension.   Tenderness to palpation: Minimal over the lesser MTPJ's  No tenderness to palpation midfoot or hindfoot regions  Neurovascularly intact. Good capillary refill. No sensory deficit to light touch. Normal proprioception. Dorsalis pedis and posterior tibial pulses 2+ bilaterally.           ASSESSMENT: Rheumatoid forefoot mild deformity/metatarsalgia.    PLAN: Treatment options were discussed with the patient.  He is getting excellent relief with his well-made orthotics which are reviewed today.    FOLLOW UP as needed if pain returns despite conservative treatments.    This note was dictated using speech recognition software and was not corrected for spelling or grammatical errors

## 2023-11-29 ENCOUNTER — ANCILLARY PROCEDURE (OUTPATIENT)
Dept: RADIOLOGY | Facility: CLINIC | Age: 63
End: 2023-11-29
Payer: COMMERCIAL

## 2023-11-29 DIAGNOSIS — M81.0 AGE RELATED OSTEOPOROSIS, UNSPECIFIED PATHOLOGICAL FRACTURE PRESENCE: ICD-10-CM

## 2023-11-29 DIAGNOSIS — M05.9 RHEUMATOID ARTHRITIS WITH POSITIVE RHEUMATOID FACTOR, INVOLVING UNSPECIFIED SITE (MULTI): ICD-10-CM

## 2023-11-29 PROCEDURE — 77080 DXA BONE DENSITY AXIAL: CPT

## 2023-11-29 PROCEDURE — 77080 DXA BONE DENSITY AXIAL: CPT | Performed by: RADIOLOGY

## 2024-02-19 ENCOUNTER — LAB (OUTPATIENT)
Dept: LAB | Facility: LAB | Age: 64
End: 2024-02-19
Payer: MEDICARE

## 2024-02-19 ENCOUNTER — TELEPHONE (OUTPATIENT)
Dept: PRIMARY CARE | Facility: CLINIC | Age: 64
End: 2024-02-19
Payer: MEDICARE

## 2024-02-19 DIAGNOSIS — R35.0 URINE FREQUENCY: ICD-10-CM

## 2024-02-19 DIAGNOSIS — I10 BENIGN ESSENTIAL HYPERTENSION: ICD-10-CM

## 2024-02-19 DIAGNOSIS — E78.2 MIXED HYPERLIPIDEMIA: ICD-10-CM

## 2024-02-19 LAB
APPEARANCE UR: CLEAR
BILIRUB UR STRIP.AUTO-MCNC: NEGATIVE MG/DL
COLOR UR: ABNORMAL
GLUCOSE UR STRIP.AUTO-MCNC: NEGATIVE MG/DL
KETONES UR STRIP.AUTO-MCNC: NEGATIVE MG/DL
LEUKOCYTE ESTERASE UR QL STRIP.AUTO: NEGATIVE
NITRITE UR QL STRIP.AUTO: NEGATIVE
PH UR STRIP.AUTO: 6 [PH]
PROT UR STRIP.AUTO-MCNC: NEGATIVE MG/DL
RBC # UR STRIP.AUTO: NEGATIVE /UL
SP GR UR STRIP.AUTO: 1
UROBILINOGEN UR STRIP.AUTO-MCNC: <2 MG/DL

## 2024-02-19 PROCEDURE — 81003 URINALYSIS AUTO W/O SCOPE: CPT

## 2024-02-19 PROCEDURE — 87086 URINE CULTURE/COLONY COUNT: CPT

## 2024-02-19 NOTE — TELEPHONE ENCOUNTER
He called to see is she would put in a order for urine culture for a poss UTI. Please call pt when order is in at [phone] he states that tomorrow that he will be gone all for testing on his daughter

## 2024-02-20 ENCOUNTER — TELEPHONE (OUTPATIENT)
Dept: PRIMARY CARE | Facility: CLINIC | Age: 64
End: 2024-02-20
Payer: MEDICARE

## 2024-02-20 ENCOUNTER — PATIENT MESSAGE (OUTPATIENT)
Dept: PRIMARY CARE | Facility: CLINIC | Age: 64
End: 2024-02-20
Payer: MEDICARE

## 2024-02-20 LAB — BACTERIA UR CULT: NO GROWTH

## 2024-02-20 NOTE — TELEPHONE ENCOUNTER
----- Message from RUBIO Bravo-CNS sent at 2/20/2024 12:56 PM EST -----  Please notify patient Culture is negative. Encourage adequate water intake. Symptoms persist, would follow up with Urology.

## 2024-02-21 ENCOUNTER — LAB (OUTPATIENT)
Dept: LAB | Facility: LAB | Age: 64
End: 2024-02-21
Payer: MEDICARE

## 2024-02-21 DIAGNOSIS — R35.0 URINE FREQUENCY: ICD-10-CM

## 2024-02-21 DIAGNOSIS — E78.2 MIXED HYPERLIPIDEMIA: ICD-10-CM

## 2024-02-21 DIAGNOSIS — I10 BENIGN ESSENTIAL HYPERTENSION: ICD-10-CM

## 2024-02-21 LAB
ALBUMIN SERPL BCP-MCNC: 4 G/DL (ref 3.4–5)
ALP SERPL-CCNC: 99 U/L (ref 33–136)
ALT SERPL W P-5'-P-CCNC: 21 U/L (ref 10–52)
ANION GAP SERPL CALC-SCNC: 12 MMOL/L (ref 10–20)
AST SERPL W P-5'-P-CCNC: 21 U/L (ref 9–39)
BILIRUB SERPL-MCNC: 1 MG/DL (ref 0–1.2)
BUN SERPL-MCNC: 11 MG/DL (ref 6–23)
CALCIUM SERPL-MCNC: 9.1 MG/DL (ref 8.6–10.3)
CHLORIDE SERPL-SCNC: 104 MMOL/L (ref 98–107)
CHOLEST SERPL-MCNC: 135 MG/DL (ref 0–199)
CHOLESTEROL/HDL RATIO: 3.7
CO2 SERPL-SCNC: 28 MMOL/L (ref 21–32)
CREAT SERPL-MCNC: 0.81 MG/DL (ref 0.5–1.3)
EGFRCR SERPLBLD CKD-EPI 2021: >90 ML/MIN/1.73M*2
ERYTHROCYTE [DISTWIDTH] IN BLOOD BY AUTOMATED COUNT: 12.9 % (ref 11.5–14.5)
GLUCOSE SERPL-MCNC: 89 MG/DL (ref 74–99)
HCT VFR BLD AUTO: 44.1 % (ref 41–52)
HDLC SERPL-MCNC: 36.2 MG/DL
HGB BLD-MCNC: 14.7 G/DL (ref 13.5–17.5)
LDLC SERPL CALC-MCNC: 74 MG/DL
MCH RBC QN AUTO: 29.3 PG (ref 26–34)
MCHC RBC AUTO-ENTMCNC: 33.3 G/DL (ref 32–36)
MCV RBC AUTO: 88 FL (ref 80–100)
NON HDL CHOLESTEROL: 99 MG/DL (ref 0–149)
NRBC BLD-RTO: 0 /100 WBCS (ref 0–0)
PLATELET # BLD AUTO: 165 X10*3/UL (ref 150–450)
POTASSIUM SERPL-SCNC: 3.6 MMOL/L (ref 3.5–5.3)
PROT SERPL-MCNC: 6.7 G/DL (ref 6.4–8.2)
RBC # BLD AUTO: 5.01 X10*6/UL (ref 4.5–5.9)
SODIUM SERPL-SCNC: 140 MMOL/L (ref 136–145)
TRIGL SERPL-MCNC: 124 MG/DL (ref 0–149)
TSH SERPL-ACNC: 2.43 MIU/L (ref 0.44–3.98)
VIT B12 SERPL-MCNC: 727 PG/ML (ref 211–911)
VLDL: 25 MG/DL (ref 0–40)
WBC # BLD AUTO: 8.2 X10*3/UL (ref 4.4–11.3)

## 2024-02-21 PROCEDURE — 80053 COMPREHEN METABOLIC PANEL: CPT

## 2024-02-21 PROCEDURE — 82607 VITAMIN B-12: CPT

## 2024-02-21 PROCEDURE — 85027 COMPLETE CBC AUTOMATED: CPT

## 2024-02-21 PROCEDURE — 80061 LIPID PANEL: CPT

## 2024-02-21 PROCEDURE — 36415 COLL VENOUS BLD VENIPUNCTURE: CPT

## 2024-02-21 PROCEDURE — 84443 ASSAY THYROID STIM HORMONE: CPT

## 2024-02-27 ASSESSMENT — ENCOUNTER SYMPTOMS: HYPERTENSION: 1

## 2024-02-28 ENCOUNTER — OFFICE VISIT (OUTPATIENT)
Dept: PRIMARY CARE | Facility: CLINIC | Age: 64
End: 2024-02-28
Payer: MEDICARE

## 2024-02-28 VITALS
DIASTOLIC BLOOD PRESSURE: 78 MMHG | HEIGHT: 66 IN | BODY MASS INDEX: 25.23 KG/M2 | WEIGHT: 157 LBS | SYSTOLIC BLOOD PRESSURE: 124 MMHG | HEART RATE: 70 BPM

## 2024-02-28 DIAGNOSIS — R05.9 COUGH, UNSPECIFIED TYPE: Primary | ICD-10-CM

## 2024-02-28 DIAGNOSIS — H61.20 IMPACTED CERUMEN, UNSPECIFIED LATERALITY: ICD-10-CM

## 2024-02-28 DIAGNOSIS — E78.2 MIXED HYPERLIPIDEMIA: ICD-10-CM

## 2024-02-28 DIAGNOSIS — Z12.5 PROSTATE CANCER SCREENING: ICD-10-CM

## 2024-02-28 DIAGNOSIS — M05.9 RHEUMATOID ARTHRITIS WITH POSITIVE RHEUMATOID FACTOR, INVOLVING UNSPECIFIED SITE (MULTI): ICD-10-CM

## 2024-02-28 DIAGNOSIS — I10 BENIGN ESSENTIAL HYPERTENSION: ICD-10-CM

## 2024-02-28 PROCEDURE — 3078F DIAST BP <80 MM HG: CPT | Performed by: CLINICAL NURSE SPECIALIST

## 2024-02-28 PROCEDURE — 3074F SYST BP LT 130 MM HG: CPT | Performed by: CLINICAL NURSE SPECIALIST

## 2024-02-28 PROCEDURE — 99214 OFFICE O/P EST MOD 30 MIN: CPT | Performed by: CLINICAL NURSE SPECIALIST

## 2024-02-28 PROCEDURE — 1036F TOBACCO NON-USER: CPT | Performed by: CLINICAL NURSE SPECIALIST

## 2024-02-28 RX ORDER — HYDROXYCHLOROQUINE SULFATE 200 MG/1
200 TABLET, FILM COATED ORAL EVERY OTHER DAY
COMMUNITY
End: 2024-03-11 | Stop reason: SDUPTHER

## 2024-02-28 RX ORDER — BENZONATATE 200 MG/1
200 CAPSULE ORAL 3 TIMES DAILY PRN
Qty: 42 CAPSULE | Refills: 0 | Status: SHIPPED | OUTPATIENT
Start: 2024-02-28 | End: 2024-03-29

## 2024-02-28 ASSESSMENT — ENCOUNTER SYMPTOMS
NAUSEA: 0
CHEST TIGHTNESS: 0
WHEEZING: 0
DIARRHEA: 0
HYPERTENSION: 1
ABDOMINAL PAIN: 0
ACTIVITY CHANGE: 0
HEADACHES: 0
SEIZURES: 0
SHORTNESS OF BREATH: 0
BACK PAIN: 0
EYE PAIN: 0
PALPITATIONS: 0
BLOOD IN STOOL: 0
APPETITE CHANGE: 0
HEMATURIA: 0
MYALGIAS: 0
SLEEP DISTURBANCE: 0
TROUBLE SWALLOWING: 0
ARTHRALGIAS: 0
CONSTIPATION: 0
FEVER: 0
CONFUSION: 0
CHILLS: 0
FLANK PAIN: 0
LOSS OF SENSATION IN FEET: 0
FATIGUE: 0
VOMITING: 0
COUGH: 0
JOINT SWELLING: 0
DYSURIA: 0
BRUISES/BLEEDS EASILY: 0
UNEXPECTED WEIGHT CHANGE: 0
PHOTOPHOBIA: 0
SORE THROAT: 0
DIZZINESS: 0
POLYDIPSIA: 0
DEPRESSION: 0
OCCASIONAL FEELINGS OF UNSTEADINESS: 0
NECK PAIN: 0
WOUND: 0

## 2024-02-28 ASSESSMENT — PATIENT HEALTH QUESTIONNAIRE - PHQ9
SUM OF ALL RESPONSES TO PHQ9 QUESTIONS 1 AND 2: 0
1. LITTLE INTEREST OR PLEASURE IN DOING THINGS: NOT AT ALL
2. FEELING DOWN, DEPRESSED OR HOPELESS: NOT AT ALL

## 2024-02-28 NOTE — PROGRESS NOTES
"Subjective   Patient ID: Dylon Louise is a 63 y.o. male who presents for Follow-up (Follow up/Discuss sinus pressure, cough, congestion X's Friday Home covid test was negative ).  Hypertension  This is a chronic problem. The current episode started more than 1 month ago. The problem has been waxing and waning since onset. The problem is resistant. Pertinent negatives include no chest pain, headaches, neck pain, palpitations or shortness of breath. Agents associated with hypertension include decongestants. Risk factors for coronary artery disease include family history.       Here today as a follow up appointment.      Following with Rheumatologist, Dr. Abraham. Had Vectra testing done, was recommended to take every other day. Diagnosed in 2011. Feels that his pain is controlled, improved \"achy joints.' Diagnosed with Gunn's Neuroma. Taking Ibuprofen OTC and well fitted shoes. Complaints of worsening pain in his right foot. Planning to consider intervention. Now following with Dr. Good for management, planning Orthotics. Improved on Orthotics.      Taking Allegra, Flonase, and Ibuprofen PRN. Allergy testing in the past, indicated positive for Red Maple Pollen, worse with Dust Mites. Worse in the Spring/Fall. Otherwise, normally well controlled.      Hypertension, started on Lisinopril after previous OV, increased. Tolerating medication. Some improvement but blood pressure has remained around 130/80. 140-150/90.    Has been trying a healthier diet, part meat/vegetarian diet. Diet/Exercise. B12. Men's Multivitamin to help.     Has not been feeling well for the past week. Increased cough/congestion. Tussin CF cough syrup. Cough starts up as soon as he is waking in the AM. No longer having any fevers. Coughing with increased pressure in the ears.     Review of Systems   Constitutional:  Negative for activity change, appetite change, chills, fatigue, fever and unexpected weight change.   HENT:  Negative for ear pain, " hearing loss, nosebleeds, sore throat, tinnitus and trouble swallowing.    Eyes:  Negative for photophobia, pain and visual disturbance.   Respiratory:  Negative for cough, chest tightness, shortness of breath and wheezing.    Cardiovascular:  Negative for chest pain, palpitations and leg swelling.   Gastrointestinal:  Negative for abdominal pain, blood in stool, constipation, diarrhea, nausea and vomiting.   Endocrine: Negative for cold intolerance, heat intolerance, polydipsia and polyuria.   Genitourinary:  Negative for dysuria, flank pain and hematuria.   Musculoskeletal:  Negative for arthralgias, back pain, joint swelling, myalgias and neck pain.   Skin:  Negative for pallor, rash and wound.   Allergic/Immunologic: Negative for immunocompromised state.   Neurological:  Negative for dizziness, seizures and headaches.   Hematological:  Does not bruise/bleed easily.   Psychiatric/Behavioral:  Negative for confusion and sleep disturbance.        Objective   Physical Exam  Vitals and nursing note reviewed.   Constitutional:       General: He is not in acute distress.     Appearance: Normal appearance.   HENT:      Head: Normocephalic.      Nose: Nose normal.   Eyes:      Conjunctiva/sclera: Conjunctivae normal.   Neck:      Vascular: No carotid bruit.   Cardiovascular:      Rate and Rhythm: Normal rate and regular rhythm.      Pulses: Normal pulses.      Heart sounds: Normal heart sounds.   Pulmonary:      Effort: Pulmonary effort is normal.      Breath sounds: Normal breath sounds.   Abdominal:      General: Bowel sounds are normal.      Palpations: Abdomen is soft.   Musculoskeletal:         General: Normal range of motion.      Cervical back: Normal range of motion.   Skin:     General: Skin is warm and dry.   Neurological:      Mental Status: He is alert and oriented to person, place, and time. Mental status is at baseline.   Psychiatric:         Mood and Affect: Mood normal.         Behavior: Behavior normal.          Assessment/Plan        Reviewed results of blood work completed with patient.      Hypertension: Blood pressure controlled at OV today. Working on dietary changes with minimal improvement. Continue Lisinopril, discussed timing of medication. Reevaluate at follow up appointment.   Elevated LDL: CT Cardiac Scoring: June 2023, Score 0. Lifestyle changes as noted.   RA: Continue Plaquenil.  Following with Rheumatologist.   Allergies: Normally controlled with OTC medications. Continue to monitor.   Cerumen Impaction: Irrigation performed at OV today.   Cough: Tessalon as prescribed.     Colonoscopy: December 2014, plan to repeat in 10 years.   Prostate Screening: June 2023.   Tdap: June 2016.   Shingrix: September 2020, January 2021.   COVID: November 2023.   RSV Vaccine: Discussed.   Bone Density: November 2023, normal.   Wellness: May 2023.     Maggie Farrell, RUBIO-CNS 02/28/24 10:26 AM

## 2024-03-11 DIAGNOSIS — M05.9 RHEUMATOID ARTHRITIS WITH POSITIVE RHEUMATOID FACTOR, INVOLVING UNSPECIFIED SITE (MULTI): Primary | ICD-10-CM

## 2024-03-11 RX ORDER — HYDROXYCHLOROQUINE SULFATE 200 MG/1
200 TABLET, FILM COATED ORAL EVERY OTHER DAY
Qty: 15 TABLET | Refills: 0 | Status: SHIPPED | OUTPATIENT
Start: 2024-03-11 | End: 2024-04-24 | Stop reason: SDUPTHER

## 2024-04-17 ENCOUNTER — PATIENT MESSAGE (OUTPATIENT)
Dept: PRIMARY CARE | Facility: CLINIC | Age: 64
End: 2024-04-17
Payer: MEDICARE

## 2024-04-17 DIAGNOSIS — E78.2 MIXED HYPERLIPIDEMIA: ICD-10-CM

## 2024-04-17 DIAGNOSIS — I10 BENIGN ESSENTIAL HYPERTENSION: ICD-10-CM

## 2024-04-17 RX ORDER — LISINOPRIL 40 MG/1
40 TABLET ORAL DAILY
Qty: 90 TABLET | Refills: 3 | Status: SHIPPED | OUTPATIENT
Start: 2024-04-17 | End: 2025-04-17

## 2024-04-24 ENCOUNTER — OFFICE VISIT (OUTPATIENT)
Dept: RHEUMATOLOGY | Facility: CLINIC | Age: 64
End: 2024-04-24
Payer: MEDICARE

## 2024-04-24 VITALS
TEMPERATURE: 98.2 F | BODY MASS INDEX: 25.02 KG/M2 | WEIGHT: 155 LBS | DIASTOLIC BLOOD PRESSURE: 92 MMHG | HEART RATE: 60 BPM | SYSTOLIC BLOOD PRESSURE: 147 MMHG

## 2024-04-24 DIAGNOSIS — M05.9 RHEUMATOID ARTHRITIS WITH POSITIVE RHEUMATOID FACTOR, INVOLVING UNSPECIFIED SITE (MULTI): ICD-10-CM

## 2024-04-24 PROCEDURE — 99214 OFFICE O/P EST MOD 30 MIN: CPT | Performed by: INTERNAL MEDICINE

## 2024-04-24 PROCEDURE — 3080F DIAST BP >= 90 MM HG: CPT | Performed by: INTERNAL MEDICINE

## 2024-04-24 PROCEDURE — 3077F SYST BP >= 140 MM HG: CPT | Performed by: INTERNAL MEDICINE

## 2024-04-24 RX ORDER — HYDROXYCHLOROQUINE SULFATE 200 MG/1
200 TABLET, FILM COATED ORAL EVERY OTHER DAY
Qty: 45 TABLET | Refills: 1 | Status: SHIPPED | OUTPATIENT
Start: 2024-04-24 | End: 2024-07-23

## 2024-04-24 ASSESSMENT — PAIN SCALES - GENERAL: PAINLEVEL: 2

## 2024-04-24 NOTE — PROGRESS NOTES
Follow-up Rheumatology Patient Visit    Chief Complaint:  Dylon Louise is a 63 y.o. male presenting today for Follow-up.    History of Presenting Problem:   62 y/o male with Hx of seropositive RA diagnosed in 2011 on PLQ present for evaluation. He recalled having a lot of swelling in his hands/feet and fatigue. He was found to have +CC and RF. He was started on PLQ and he responded very well. Since has been on Plaquenil he has minimal joint complaints he has very few joint exacerbations, at last visit he was interested in discussing whether he is in disease remission.  Vectra score March 2023 was 21,   He is still on   mg. Every other day  He report his feet are better with Foot insert for reynolds per his orthopedic doctor...      Problem List:   Patient Active Problem List   Diagnosis    Allergic rhinitis    Benign essential hypertension    Eczema    Hemorrhoids    Hyperlipemia    Low-density-lipoid-type (LDL) hyperlipoproteinemia    Nocturia    Rheumatoid arthritis (Multi)    Seasonal allergies    Vitiligo    Neck pain       Past Medical History:   Past Medical History:   Diagnosis Date    Allergic     Arthritis     Eczema     Hypertension     Other conditions influencing health status     No significant past medical history       Surgical History: No past surgical history on file.     Allergies:   Allergies   Allergen Reactions    Azithromycin Unknown    Pollen Extracts Unknown    Tobramycin Other and Rash       Medications:   Current Outpatient Medications:     fluticasone (Flonase) 50 mcg/actuation nasal spray, Administer 2 sprays into each nostril once daily., Disp: , Rfl:     ibuprofen 200 mg tablet, Take 1 tablet (200 mg) by mouth., Disp: , Rfl:     lisinopril 40 mg tablet, Take 1 tablet (40 mg) by mouth once daily., Disp: 90 tablet, Rfl: 3    hydroxychloroquine (Plaquenil) 200 mg tablet, Take 1 tablet (200 mg) by mouth every other day., Disp: 45 tablet, Rfl: 1      Objective   Physical  Examination:    Visit Vitals  BP (!) 147/92 Comment: did not take meds today   Pulse 60   Temp 36.8 °C (98.2 °F)   Wt 70.3 kg (155 lb)   BMI 25.02 kg/m²   Smoking Status Never   BSA 1.81 m²        Gen: NAD, A&O x 3  HEENT: clear sclera and conjunctiva,     Musculoskeletal:   Neck; WNL, full ROM  Shoulder: WNL, full ROM  Elbow:WNL, full ROM, no effusion noted  Wrist and fingers;no active synovitis noted, Full ROM in the Wrist , Good fist and   Knees:  No effusions or crepitation, full ROM.  Hips; WNL, full ROM, Negative Parekr test  Ankle, Feet; WNL, full ROM    Skin: No rashes or lesions seen, no nail changes  Neuro: A&O x3, Normal Gait    Procedures :None    Orders:  No orders of the defined types were placed in this encounter.       Provider Impression:   Assessment/Plan   Encounter Diagnosis   Name Primary?    Rheumatoid arthritis with positive rheumatoid factor, involving unspecified site (Multi)         62 y/o male with Hx of seropositive RA diagnosed in 2011 on PLQ present for evaluation. Patient reports he has minimal disease activity on chronic hydroxychloroquine therapy and is interested in weaning off therapy. We discussed the risk and benefit of treatment and also going treatment.  -Conitnue with PLQ  every other day, patient is up-to-date with retinal eye exam  -Recent DEXA show normal bone density,  lowest score is -0.6. Recommend calcium 1000 and Vitamin d 2000 units supplement.   -Follow-up in 6 months

## 2024-08-27 ASSESSMENT — PROMIS GLOBAL HEALTH SCALE
RATE_AVERAGE_FATIGUE: MILD
CARRYOUT_PHYSICAL_ACTIVITIES: COMPLETELY
RATE_MENTAL_HEALTH: VERY GOOD
CARRYOUT_SOCIAL_ACTIVITIES: VERY GOOD
EMOTIONAL_PROBLEMS: SOMETIMES
RATE_QUALITY_OF_LIFE: VERY GOOD
RATE_GENERAL_HEALTH: VERY GOOD
RATE_PHYSICAL_HEALTH: GOOD
RATE_AVERAGE_PAIN: 1
RATE_SOCIAL_SATISFACTION: GOOD

## 2024-08-28 ENCOUNTER — LAB (OUTPATIENT)
Dept: LAB | Facility: LAB | Age: 64
End: 2024-08-28
Payer: MEDICARE

## 2024-08-28 ENCOUNTER — APPOINTMENT (OUTPATIENT)
Dept: PRIMARY CARE | Facility: CLINIC | Age: 64
End: 2024-08-28
Payer: MEDICARE

## 2024-08-28 VITALS
HEART RATE: 60 BPM | HEIGHT: 66 IN | BODY MASS INDEX: 24.75 KG/M2 | DIASTOLIC BLOOD PRESSURE: 84 MMHG | SYSTOLIC BLOOD PRESSURE: 148 MMHG | WEIGHT: 154 LBS

## 2024-08-28 DIAGNOSIS — E78.2 MIXED HYPERLIPIDEMIA: ICD-10-CM

## 2024-08-28 DIAGNOSIS — Z12.11 COLON CANCER SCREENING: ICD-10-CM

## 2024-08-28 DIAGNOSIS — H61.23 BILATERAL IMPACTED CERUMEN: ICD-10-CM

## 2024-08-28 DIAGNOSIS — R05.9 COUGH, UNSPECIFIED TYPE: ICD-10-CM

## 2024-08-28 DIAGNOSIS — Z12.5 PROSTATE CANCER SCREENING: ICD-10-CM

## 2024-08-28 DIAGNOSIS — E78.2 MIXED HYPERLIPIDEMIA: Primary | ICD-10-CM

## 2024-08-28 DIAGNOSIS — I10 BENIGN ESSENTIAL HYPERTENSION: ICD-10-CM

## 2024-08-28 DIAGNOSIS — Z00.00 HEALTHCARE MAINTENANCE: ICD-10-CM

## 2024-08-28 LAB
ALBUMIN SERPL BCP-MCNC: 4.1 G/DL (ref 3.4–5)
ALP SERPL-CCNC: 83 U/L (ref 33–136)
ALT SERPL W P-5'-P-CCNC: 15 U/L (ref 10–52)
ANION GAP SERPL CALC-SCNC: 9 MMOL/L (ref 10–20)
AST SERPL W P-5'-P-CCNC: 18 U/L (ref 9–39)
BILIRUB SERPL-MCNC: 1 MG/DL (ref 0–1.2)
BUN SERPL-MCNC: 13 MG/DL (ref 6–23)
CALCIUM SERPL-MCNC: 9.1 MG/DL (ref 8.6–10.3)
CHLORIDE SERPL-SCNC: 105 MMOL/L (ref 98–107)
CHOLEST SERPL-MCNC: 145 MG/DL (ref 0–199)
CHOLESTEROL/HDL RATIO: 3.6
CO2 SERPL-SCNC: 32 MMOL/L (ref 21–32)
CREAT SERPL-MCNC: 0.83 MG/DL (ref 0.5–1.3)
EGFRCR SERPLBLD CKD-EPI 2021: >90 ML/MIN/1.73M*2
ERYTHROCYTE [DISTWIDTH] IN BLOOD BY AUTOMATED COUNT: 12.8 % (ref 11.5–14.5)
EST. AVERAGE GLUCOSE BLD GHB EST-MCNC: 111 MG/DL
GLUCOSE SERPL-MCNC: 93 MG/DL (ref 74–99)
HBA1C MFR BLD: 5.5 %
HCT VFR BLD AUTO: 40.8 % (ref 41–52)
HCV AB SER QL: NONREACTIVE
HDLC SERPL-MCNC: 40 MG/DL
HGB BLD-MCNC: 14.3 G/DL (ref 13.5–17.5)
LDLC SERPL CALC-MCNC: 85 MG/DL
MCH RBC QN AUTO: 30.2 PG (ref 26–34)
MCHC RBC AUTO-ENTMCNC: 35 G/DL (ref 32–36)
MCV RBC AUTO: 86 FL (ref 80–100)
NON HDL CHOLESTEROL: 105 MG/DL (ref 0–149)
NRBC BLD-RTO: 0 /100 WBCS (ref 0–0)
PLATELET # BLD AUTO: 176 X10*3/UL (ref 150–450)
POTASSIUM SERPL-SCNC: 3.9 MMOL/L (ref 3.5–5.3)
PROT SERPL-MCNC: 6.4 G/DL (ref 6.4–8.2)
PSA SERPL-MCNC: 1.53 NG/ML
RBC # BLD AUTO: 4.74 X10*6/UL (ref 4.5–5.9)
SODIUM SERPL-SCNC: 142 MMOL/L (ref 136–145)
TRIGL SERPL-MCNC: 101 MG/DL (ref 0–149)
TSH SERPL-ACNC: 2.9 MIU/L (ref 0.44–3.98)
VIT B12 SERPL-MCNC: 659 PG/ML (ref 211–911)
VLDL: 20 MG/DL (ref 0–40)
WBC # BLD AUTO: 4.1 X10*3/UL (ref 4.4–11.3)

## 2024-08-28 PROCEDURE — 82607 VITAMIN B-12: CPT

## 2024-08-28 PROCEDURE — 3079F DIAST BP 80-89 MM HG: CPT | Performed by: CLINICAL NURSE SPECIALIST

## 2024-08-28 PROCEDURE — 80053 COMPREHEN METABOLIC PANEL: CPT

## 2024-08-28 PROCEDURE — 99396 PREV VISIT EST AGE 40-64: CPT | Performed by: CLINICAL NURSE SPECIALIST

## 2024-08-28 PROCEDURE — 84153 ASSAY OF PSA TOTAL: CPT

## 2024-08-28 PROCEDURE — 36415 COLL VENOUS BLD VENIPUNCTURE: CPT

## 2024-08-28 PROCEDURE — 84443 ASSAY THYROID STIM HORMONE: CPT

## 2024-08-28 PROCEDURE — 83036 HEMOGLOBIN GLYCOSYLATED A1C: CPT

## 2024-08-28 PROCEDURE — 85027 COMPLETE CBC AUTOMATED: CPT

## 2024-08-28 PROCEDURE — 1036F TOBACCO NON-USER: CPT | Performed by: CLINICAL NURSE SPECIALIST

## 2024-08-28 PROCEDURE — 3077F SYST BP >= 140 MM HG: CPT | Performed by: CLINICAL NURSE SPECIALIST

## 2024-08-28 PROCEDURE — 3008F BODY MASS INDEX DOCD: CPT | Performed by: CLINICAL NURSE SPECIALIST

## 2024-08-28 PROCEDURE — 86803 HEPATITIS C AB TEST: CPT

## 2024-08-28 PROCEDURE — 80061 LIPID PANEL: CPT

## 2024-08-28 RX ORDER — HYDROXYCHLOROQUINE SULFATE 200 MG/1
200 TABLET, FILM COATED ORAL EVERY OTHER DAY
COMMUNITY
Start: 2024-07-24

## 2024-08-28 ASSESSMENT — ENCOUNTER SYMPTOMS
CONFUSION: 0
COUGH: 0
UNEXPECTED WEIGHT CHANGE: 0
NAUSEA: 0
NECK PAIN: 0
BRUISES/BLEEDS EASILY: 0
APPETITE CHANGE: 0
ARTHRALGIAS: 0
WOUND: 0
DYSURIA: 0
SHORTNESS OF BREATH: 0
EYE PAIN: 0
PALPITATIONS: 0
JOINT SWELLING: 0
FLANK PAIN: 0
CONSTIPATION: 0
MYALGIAS: 0
POLYDIPSIA: 0
ACTIVITY CHANGE: 0
VOMITING: 0
TROUBLE SWALLOWING: 0
SORE THROAT: 0
HEMATURIA: 0
PHOTOPHOBIA: 0
SLEEP DISTURBANCE: 0
CHEST TIGHTNESS: 0
ABDOMINAL PAIN: 0
FEVER: 0
DIARRHEA: 0
LOSS OF SENSATION IN FEET: 0
DEPRESSION: 0
FATIGUE: 0
HEADACHES: 0
BACK PAIN: 0
WHEEZING: 0
OCCASIONAL FEELINGS OF UNSTEADINESS: 0
SEIZURES: 0
DIZZINESS: 0
CHILLS: 0
BLOOD IN STOOL: 0

## 2024-08-28 NOTE — PROGRESS NOTES
"Subjective   Patient ID: Dylon Louise is a 64 y.o. male who presents for Annual Exam (Wellness exam/Discuss lower abdominal pain ).  HPI    Here today as a follow up appointment. Due for Wellness. Blood pressure has been running around 130's over 70's-80's.     Over the weekend started to have lower abdominal pain. Diarrhea initially, subsided. Pain across the lower abdomen, worse in the LLQ. Pain today that has subsided. Low grade fever, again resolved. Bowels have been normal.      Following with Rheumatologist, Dr. Abraham. Had Vectra testing done, was recommended to take every other day. Diagnosed in 2011. Feels that his pain is controlled, improved \"achy joints.' Diagnosed with Gunn's Neuroma. Taking Ibuprofen OTC and well fitted shoes. Complaints of worsening pain in his right foot. Planning to consider intervention. Now following with Dr. Good for management, improved on Orthotics.      Taking Allegra, Flonase, and Ibuprofen PRN. Allergy testing in the past, indicated positive for Red Maple Pollen, worse with Dust Mites. Worse in the Spring/Fall. Otherwise, normally well controlled.      Has continued his Lisinopril for Hypertension. Did not take his medication today prior to visit.      Has been trying a healthier diet, part meat/vegetarian diet. Diet/Exercise. B12. Men's Multivitamin to help.      Due for Vision exam in October, annually up to date.     Review of Systems   Constitutional:  Negative for activity change, appetite change, chills, fatigue, fever and unexpected weight change.   HENT:  Negative for ear pain, hearing loss, nosebleeds, sore throat, tinnitus and trouble swallowing.    Eyes:  Negative for photophobia, pain and visual disturbance.   Respiratory:  Negative for cough, chest tightness, shortness of breath and wheezing.    Cardiovascular:  Negative for chest pain, palpitations and leg swelling.   Gastrointestinal:  Negative for abdominal pain, blood in stool, constipation, diarrhea, " nausea and vomiting.   Endocrine: Negative for cold intolerance, heat intolerance, polydipsia and polyuria.   Genitourinary:  Negative for dysuria, flank pain and hematuria.   Musculoskeletal:  Negative for arthralgias, back pain, joint swelling, myalgias and neck pain.   Skin:  Negative for pallor, rash and wound.   Allergic/Immunologic: Negative for immunocompromised state.   Neurological:  Negative for dizziness, seizures and headaches.   Hematological:  Does not bruise/bleed easily.   Psychiatric/Behavioral:  Negative for confusion and sleep disturbance.        Objective   Physical Exam  Vitals and nursing note reviewed.   Constitutional:       General: He is not in acute distress.     Appearance: Normal appearance.   HENT:      Head: Normocephalic.      Nose: Nose normal.   Eyes:      Conjunctiva/sclera: Conjunctivae normal.   Neck:      Vascular: No carotid bruit.   Cardiovascular:      Rate and Rhythm: Normal rate and regular rhythm.      Pulses: Normal pulses.      Heart sounds: Normal heart sounds.   Pulmonary:      Effort: Pulmonary effort is normal.      Breath sounds: Normal breath sounds.   Abdominal:      General: Bowel sounds are normal.      Palpations: Abdomen is soft.   Musculoskeletal:         General: Normal range of motion.      Cervical back: Normal range of motion.   Skin:     General: Skin is warm and dry.   Neurological:      Mental Status: He is alert and oriented to person, place, and time. Mental status is at baseline.   Psychiatric:         Mood and Affect: Mood normal.         Behavior: Behavior normal.       Assessment/Plan        Reviewed results of blood work completed with patient.      Hypertension: Blood pressure controlled at OV today. Working on dietary changes with minimal improvement. Continue Lisinopril, discussed timing of medication. Reevaluate at follow up appointment.   Elevated LDL: CT Cardiac Scoring: June 2023, Score 0. Lifestyle changes as noted.   RA: Continue  Plaquenil.  Following with Rheumatologist.   Allergies: Normally controlled with OTC medications. Continue to monitor.   Wellness: Routine and age appropriate recommendations discussed with the patient today and patient verbalized understanding of the recommendations.  Questions answered.  Age appropriate immunizations and preventative screenings discussed with the patient and ordered as appropriate. Labs updated and ordered as indicated. Recommend healthy diet and daily exercise to maintain healthy body weight.   Cerumen Impaction: Irrigation performed at visit today with removal of impaction.      Colonoscopy: December 2014, plan to repeat in 10 years. Ordered for 2024.   Prostate Screening: August 2024.    Tdap: June 2016.   Shingrix: September 2020, January 2021.   COVID: November 2023.   RSV Vaccine: Discussed.   Bone Density: November 2023, normal.   Wellness: August 2024.        RUBIO Bravo-CNS 08/28/24 11:08 AM

## 2024-08-29 DIAGNOSIS — I10 BENIGN ESSENTIAL HYPERTENSION: Primary | ICD-10-CM

## 2024-09-03 ENCOUNTER — PATIENT MESSAGE (OUTPATIENT)
Dept: PRIMARY CARE | Facility: CLINIC | Age: 64
End: 2024-09-03
Payer: MEDICARE

## 2024-09-03 DIAGNOSIS — U07.1 COVID: Primary | ICD-10-CM

## 2024-10-22 ENCOUNTER — TELEPHONE (OUTPATIENT)
Dept: GASTROENTEROLOGY | Facility: CLINIC | Age: 64
End: 2024-10-22
Payer: MEDICARE

## 2024-10-22 NOTE — TELEPHONE ENCOUNTER
----- Message from Kiah LANGE sent at 9/4/2024  9:50 AM EDT -----  Regarding: RE: Open access  Patient states he is not due until December of this year and wants to make sure his insurance will cover the procedure. I will call the Patient once the schedule is available.  ----- Message -----  From: Dawna Pierre RN  Sent: 9/3/2024  12:28 PM EDT  To:  [email or web address]  Subject: Open access                                      Open access

## 2024-10-23 ENCOUNTER — APPOINTMENT (OUTPATIENT)
Dept: RHEUMATOLOGY | Facility: CLINIC | Age: 64
End: 2024-10-23
Payer: MEDICARE

## 2024-10-23 VITALS
SYSTOLIC BLOOD PRESSURE: 136 MMHG | OXYGEN SATURATION: 98 % | DIASTOLIC BLOOD PRESSURE: 73 MMHG | BODY MASS INDEX: 25.07 KG/M2 | RESPIRATION RATE: 18 BRPM | WEIGHT: 155.3 LBS | HEART RATE: 65 BPM

## 2024-10-23 DIAGNOSIS — M05.9 RHEUMATOID ARTHRITIS WITH POSITIVE RHEUMATOID FACTOR, INVOLVING UNSPECIFIED SITE (MULTI): Primary | ICD-10-CM

## 2024-10-23 PROCEDURE — 1036F TOBACCO NON-USER: CPT | Performed by: INTERNAL MEDICINE

## 2024-10-23 PROCEDURE — 3078F DIAST BP <80 MM HG: CPT | Performed by: INTERNAL MEDICINE

## 2024-10-23 PROCEDURE — 3075F SYST BP GE 130 - 139MM HG: CPT | Performed by: INTERNAL MEDICINE

## 2024-10-23 PROCEDURE — 99214 OFFICE O/P EST MOD 30 MIN: CPT | Performed by: INTERNAL MEDICINE

## 2024-10-23 RX ORDER — HYDROXYCHLOROQUINE SULFATE 200 MG/1
200 TABLET, FILM COATED ORAL EVERY OTHER DAY
Qty: 45 TABLET | Refills: 1 | Status: SHIPPED | OUTPATIENT
Start: 2024-10-23 | End: 2025-01-21

## 2024-10-23 ASSESSMENT — PAIN SCALES - GENERAL: PAINLEVEL_OUTOF10: 0-NO PAIN

## 2024-10-23 NOTE — PROGRESS NOTES
Follow-up Rheumatology Patient Visit    Chief Complaint:  Dylon Louise is a 64 y.o. male presenting today for Follow-up.    History of Presenting Problem:   63 y/o male with Hx of seropositive RA diagnosed in 2011 on PLQ present for evaluation. He recalled having a lot of swelling in his hands/feet and fatigue. He was found to have +CC and RF. He was started on PLQ and he responded very well. Since has been on Plaquenil he has minimal joint complaints he has very few joint exacerbations, at last visit he was interested in discussing whether he is in disease remission.  Vectra score March 2023 was 21,   Today patient presents for follow-up he is still on   mg. Every other day.  He has no acute joint pain feet has been doing well with inserts      Problem List:   Patient Active Problem List   Diagnosis    Allergic rhinitis    Benign essential hypertension    Eczema    Hemorrhoids    Hyperlipemia    Low-density-lipoid-type (LDL) hyperlipoproteinemia    Nocturia    Rheumatoid arthritis    Seasonal allergies    Vitiligo    Neck pain    Healthcare maintenance       Past Medical History:   Past Medical History:   Diagnosis Date    Allergic     Arthritis     Eczema     Hypertension     Other conditions influencing health status     No significant past medical history       Surgical History: No past surgical history on file.     Allergies:   Allergies   Allergen Reactions    Azithromycin Unknown    Pollen Extracts Unknown    Tobramycin Other and Rash       Medications:   Current Outpatient Medications:     fluticasone (Flonase) 50 mcg/actuation nasal spray, Administer 2 sprays into each nostril once daily., Disp: , Rfl:     hydroxychloroquine (Plaquenil) 200 mg tablet, Take 1 tablet (200 mg) by mouth every other day., Disp: , Rfl:     ibuprofen 200 mg tablet, Take 1 tablet (200 mg) by mouth., Disp: , Rfl:     lisinopril 40 mg tablet, Take 1 tablet (40 mg) by mouth once daily., Disp: 90 tablet, Rfl: 3    vit  C/E/cuperic/zinc/lutein (PRESERVISION LUTEIN ORAL), Take by mouth., Disp: , Rfl:       Objective   Physical Examination:    Visit Vitals  /73   Pulse 65   Resp 18   Wt 70.4 kg (155 lb 4.8 oz)   SpO2 98%   BMI 25.07 kg/m²   Smoking Status Never   BSA 1.81 m²        Gen: NAD, A&O x 3  HEENT: clear sclera and conjunctiva,     Musculoskeletal:   Neck; WNL, full ROM  Shoulder: WNL, full ROM  Elbow:WNL, full ROM, no effusion noted  Wrist and fingers;no active synovitis noted, Full ROM in the Wrist , Good fist and   Knees:  No effusions or crepitation, full ROM.  Hips; WNL, full ROM, Negative Parker test  Ankle, Feet; WNL, full ROM    Skin: No rashes or lesions seen, no nail changes  Neuro: A&O x3, Normal Gait    Procedures :None    Orders:  No orders of the defined types were placed in this encounter.       Provider Impression:   Assessment/Plan   Encounter Diagnosis   Name Primary?    Rheumatoid arthritis with positive rheumatoid factor, involving unspecified site (Multi) Yes          65 y/o male with Hx of seropositive RA diagnosed in 2011 on PLQ present for evaluation. Patient reports he has minimal disease activity on chronic hydroxychloroquine therapy and is interested in weaning off therapy. We discussed the risk and benefit of treatment and also going treatment.  -Conitnue with PLQ  every other day, patient is up-to-date with retinal eye exam  -Recent DEXA show normal bone density,  lowest score is -0.6. Recommend calcium 1000 and Vitamin d 2000 units supplement.   -reviewed recent labs   -Follow-up in 6 months

## 2024-12-09 ENCOUNTER — ANESTHESIA EVENT (OUTPATIENT)
Dept: GASTROENTEROLOGY | Facility: HOSPITAL | Age: 64
End: 2024-12-09
Payer: MEDICARE

## 2024-12-10 ENCOUNTER — ANESTHESIA (OUTPATIENT)
Dept: GASTROENTEROLOGY | Facility: HOSPITAL | Age: 64
End: 2024-12-10
Payer: MEDICARE

## 2024-12-10 ENCOUNTER — HOSPITAL ENCOUNTER (OUTPATIENT)
Dept: GASTROENTEROLOGY | Facility: HOSPITAL | Age: 64
Discharge: HOME | End: 2024-12-10
Payer: MEDICARE

## 2024-12-10 VITALS
TEMPERATURE: 97.2 F | OXYGEN SATURATION: 96 % | DIASTOLIC BLOOD PRESSURE: 87 MMHG | HEART RATE: 58 BPM | SYSTOLIC BLOOD PRESSURE: 134 MMHG | RESPIRATION RATE: 16 BRPM

## 2024-12-10 DIAGNOSIS — Z12.11 COLON CANCER SCREENING: ICD-10-CM

## 2024-12-10 PROCEDURE — G0121 COLON CA SCRN NOT HI RSK IND: HCPCS | Performed by: INTERNAL MEDICINE

## 2024-12-10 PROCEDURE — 45378 DIAGNOSTIC COLONOSCOPY: CPT | Performed by: INTERNAL MEDICINE

## 2024-12-10 PROCEDURE — 7100000010 HC PHASE TWO TIME - EACH INCREMENTAL 1 MINUTE

## 2024-12-10 PROCEDURE — 3700000002 HC GENERAL ANESTHESIA TIME - EACH INCREMENTAL 1 MINUTE

## 2024-12-10 PROCEDURE — 2500000004 HC RX 250 GENERAL PHARMACY W/ HCPCS (ALT 636 FOR OP/ED): Performed by: NURSE ANESTHETIST, CERTIFIED REGISTERED

## 2024-12-10 PROCEDURE — 3700000001 HC GENERAL ANESTHESIA TIME - INITIAL BASE CHARGE

## 2024-12-10 PROCEDURE — 7100000009 HC PHASE TWO TIME - INITIAL BASE CHARGE

## 2024-12-10 RX ORDER — SODIUM CHLORIDE 0.9 % (FLUSH) 0.9 %
SYRINGE (ML) INJECTION AS NEEDED
Status: DISCONTINUED | OUTPATIENT
Start: 2024-12-10 | End: 2024-12-10

## 2024-12-10 RX ORDER — LIDOCAINE HYDROCHLORIDE 20 MG/ML
INJECTION, SOLUTION EPIDURAL; INFILTRATION; INTRACAUDAL; PERINEURAL AS NEEDED
Status: DISCONTINUED | OUTPATIENT
Start: 2024-12-10 | End: 2024-12-10

## 2024-12-10 RX ORDER — DOXYCYCLINE 100 MG/1
100 TABLET ORAL DAILY
COMMUNITY

## 2024-12-10 RX ORDER — PROPOFOL 10 MG/ML
INJECTION, EMULSION INTRAVENOUS AS NEEDED
Status: DISCONTINUED | OUTPATIENT
Start: 2024-12-10 | End: 2024-12-10

## 2024-12-10 SDOH — HEALTH STABILITY: MENTAL HEALTH: CURRENT SMOKER: 0

## 2024-12-10 ASSESSMENT — COLUMBIA-SUICIDE SEVERITY RATING SCALE - C-SSRS
1. IN THE PAST MONTH, HAVE YOU WISHED YOU WERE DEAD OR WISHED YOU COULD GO TO SLEEP AND NOT WAKE UP?: NO
6. HAVE YOU EVER DONE ANYTHING, STARTED TO DO ANYTHING, OR PREPARED TO DO ANYTHING TO END YOUR LIFE?: NO
2. HAVE YOU ACTUALLY HAD ANY THOUGHTS OF KILLING YOURSELF?: NO

## 2024-12-10 ASSESSMENT — PAIN SCALES - GENERAL
PAINLEVEL_OUTOF10: 0 - NO PAIN
PAINLEVEL_OUTOF10: 0 - NO PAIN
PAIN_LEVEL: 0
PAINLEVEL_OUTOF10: 0 - NO PAIN

## 2024-12-10 ASSESSMENT — PAIN - FUNCTIONAL ASSESSMENT
PAIN_FUNCTIONAL_ASSESSMENT: 0-10

## 2024-12-10 NOTE — ANESTHESIA PREPROCEDURE EVALUATION
Patient: Dylon Luoise    Procedure Information       Date/Time: 12/10/24 0730    Scheduled providers: Yogesh Sheikh DO    Procedure: COLONOSCOPY    Location: [facility]            Relevant Problems   Anesthesia (within normal limits)      Cardiac   (+) Benign essential hypertension   (+) Hyperlipemia   (+) Low-density-lipoid-type (LDL) hyperlipoproteinemia      Musculoskeletal   (+) Rheumatoid arthritis      HEENT   (+) Seasonal allergies      Skin   (+) Eczema       Clinical information reviewed:   Tobacco  Allergies  Meds   Med Hx  Surg Hx   Fam Hx  Soc Hx        NPO Detail:  NPO/Void Status  Carbohydrate Drink Given Prior to Surgery? : N  Date of Last Liquid: 12/10/24  Time of Last Liquid: 0200  Date of Last Solid: 12/08/24  Time of Last Solid: 1800  Last Intake Type: Clear fluids  Time of Last Void: 0643         Physical Exam    Airway  Mallampati: II  TM distance: >3 FB  Neck ROM: full     Cardiovascular - normal exam     Dental - normal exam     Pulmonary - normal exam     Abdominal - normal exam             Anesthesia Plan    History of general anesthesia?: yes  History of complications of general anesthesia?: no    ASA 2     MAC     The patient is not a current smoker.    intravenous induction   Anesthetic plan and risks discussed with patient.    Plan discussed with CRNA.

## 2024-12-10 NOTE — H&P
History Of Present Illness  Dylon Louise is a 64 y.o. male presenting for screening colon.     Past Medical History  Past Medical History:   Diagnosis Date    Allergic     Arthritis     Eczema     Hypertension     Other conditions influencing health status     No significant past medical history       Surgical History  History reviewed. No pertinent surgical history.     Social History  He reports that he has never smoked. He has never used smokeless tobacco. He reports current alcohol use of about 2.0 standard drinks of alcohol per week. He reports that he does not use drugs.    Family History  Family History   Problem Relation Name Age of Onset    Hearing loss Mother      Heart disease Father      Cancer Father      Hypertension Maternal Grandfather      Hearing loss Maternal Grandfather      Cancer Paternal Grandmother          Allergies  Azithromycin, Pollen extracts, and Tobramycin    Review of Systems     Physical Exam     Last Recorded Vitals  Blood pressure (!) 176/114, pulse 81, temperature 36.2 °C (97.1 °F), temperature source Temporal, resp. rate 16, SpO2 97%.    Relevant Results        Current Outpatient Medications   Medication Instructions    doxycycline (ADOXA) 100 mg, Daily    fluticasone (Flonase) 50 mcg/actuation nasal spray 2 sprays, Daily    hydroxychloroquine (PLAQUENIL) 200 mg, oral, Every other day    ibuprofen 200 mg    lisinopril 40 mg, oral, Daily    vit C/E/cuperic/zinc/lutein (PRESERVISION LUTEIN ORAL) Take by mouth.        Assessment/Plan   Assessment & Plan  Colon cancer screening        Average Risk Colorectal Cancer   - screening colonoscopy for evaluation     Risk and benefits of the endoscopic procedure including bleeding perforation and infection were discussed with patient and they wish to proceed                 Yogesh Sheikh DO

## 2024-12-10 NOTE — ANESTHESIA POSTPROCEDURE EVALUATION
Patient: Dylon Louise    Procedure Summary       Date: 12/10/24 Room / Location: [facility]    Anesthesia Start: 0722 Anesthesia Stop: 0743    Procedure: COLONOSCOPY Diagnosis: Colon cancer screening    Scheduled Providers: Yogesh Sheikh DO Responsible Provider: ANA Padron    Anesthesia Type: MAC ASA Status: 2            Anesthesia Type: MAC    Vitals Value Taken Time   /73 12/10/24 0741   Temp 36.7 °C (98 °F) 12/10/24 0741   Pulse 58 12/10/24 0741   Resp 12 12/10/24 0741   SpO2 96 % 12/10/24 0741       Anesthesia Post Evaluation    Patient location during evaluation: PACU  Patient participation: complete - patient cannot participate  Level of consciousness: responsive to verbal stimuli and responsive to light touch  Pain score: 0  Pain management: adequate  Airway patency: patent  Cardiovascular status: acceptable and hemodynamically stable  Respiratory status: acceptable, spontaneous ventilation and nasal cannula  Hydration status: acceptable  Postoperative Nausea and Vomiting: none        There were no known notable events for this encounter.

## 2025-02-13 ENCOUNTER — PATIENT MESSAGE (OUTPATIENT)
Dept: PRIMARY CARE | Facility: CLINIC | Age: 65
End: 2025-02-13
Payer: MEDICARE

## 2025-02-13 DIAGNOSIS — J10.1 INFLUENZA A: Primary | ICD-10-CM

## 2025-02-14 ENCOUNTER — TELEPHONE (OUTPATIENT)
Dept: PRIMARY CARE | Facility: CLINIC | Age: 65
End: 2025-02-14
Payer: MEDICARE

## 2025-02-14 DIAGNOSIS — J10.1 INFLUENZA A: ICD-10-CM

## 2025-02-14 RX ORDER — OSELTAMIVIR PHOSPHATE 75 MG/1
75 CAPSULE ORAL 2 TIMES DAILY
Qty: 10 CAPSULE | Refills: 0 | Status: SHIPPED | OUTPATIENT
Start: 2025-02-14 | End: 2025-02-19

## 2025-02-14 RX ORDER — OSELTAMIVIR PHOSPHATE 75 MG/1
75 CAPSULE ORAL 2 TIMES DAILY
Qty: 10 CAPSULE | Refills: 0 | Status: CANCELLED | OUTPATIENT
Start: 2025-02-14 | End: 2025-02-19

## 2025-02-14 RX ORDER — OSELTAMIVIR PHOSPHATE 75 MG/1
75 CAPSULE ORAL 2 TIMES DAILY
Qty: 10 CAPSULE | Refills: 0 | Status: SHIPPED | OUTPATIENT
Start: 2025-02-14 | End: 2025-02-14 | Stop reason: SDUPTHER

## 2025-02-14 NOTE — TELEPHONE ENCOUNTER
Patient  given rx for tamiflu but was sent to wrong pharmacy. Please send to [facility] pharmacy

## 2025-02-25 ENCOUNTER — OFFICE VISIT (OUTPATIENT)
Dept: URGENT CARE | Age: 65
End: 2025-02-25
Payer: MEDICARE

## 2025-02-25 VITALS
BODY MASS INDEX: 25.02 KG/M2 | OXYGEN SATURATION: 98 % | TEMPERATURE: 97.6 F | HEART RATE: 85 BPM | DIASTOLIC BLOOD PRESSURE: 92 MMHG | SYSTOLIC BLOOD PRESSURE: 152 MMHG | WEIGHT: 155 LBS | RESPIRATION RATE: 18 BRPM

## 2025-02-25 DIAGNOSIS — R22.0 FACIAL SWELLING: Primary | ICD-10-CM

## 2025-02-25 DIAGNOSIS — T78.40XA ALLERGIC REACTION, INITIAL ENCOUNTER: ICD-10-CM

## 2025-02-25 LAB — PSA SERPL-MCNC: 1.48 NG/ML

## 2025-02-25 RX ORDER — HYDROXYCHLOROQUINE SULFATE 200 MG/1
200 TABLET, FILM COATED ORAL EVERY OTHER DAY
COMMUNITY
Start: 2025-02-05

## 2025-02-25 RX ORDER — METHYLPREDNISOLONE 4 MG/1
TABLET ORAL
Qty: 21 TABLET | Refills: 0 | Status: SHIPPED | OUTPATIENT
Start: 2025-02-25 | End: 2025-02-25

## 2025-02-25 RX ORDER — METHYLPREDNISOLONE 4 MG/1
TABLET ORAL
Qty: 21 TABLET | Refills: 0 | Status: SHIPPED | OUTPATIENT
Start: 2025-02-25

## 2025-02-25 RX ORDER — DEXAMETHASONE SODIUM PHOSPHATE 10 MG/ML
10 INJECTION INTRAMUSCULAR; INTRAVENOUS ONCE
Status: COMPLETED | OUTPATIENT
Start: 2025-02-25 | End: 2025-02-25

## 2025-02-25 RX ADMIN — DEXAMETHASONE SODIUM PHOSPHATE 10 MG: 10 INJECTION INTRAMUSCULAR; INTRAVENOUS at 13:48

## 2025-02-25 ASSESSMENT — ENCOUNTER SYMPTOMS
WHEEZING: 0
FACIAL SWELLING: 1
COUGH: 1
STRIDOR: 0
SHORTNESS OF BREATH: 0

## 2025-02-25 NOTE — PROGRESS NOTES
Subjective   Patient ID: Dylon Louise is a 64 y.o. male. They present today with a chief complaint of Facial Swelling (Right cheek and lip swelling for several hours; Pt recalls taking Mucinex DM & Mucinex cough lozenges for the first time yesterday around 2:30pm.).    History of Present Illness  See [facility]      History provided by:  Patient      Past Medical History  Allergies as of 02/25/2025 - Reviewed 02/25/2025   Allergen Reaction Noted    Azithromycin Unknown 05/30/2023    Pollen extracts Unknown 01/17/2018    Tobramycin Other and Rash 02/07/2017       (Not in a hospital admission)       Past Medical History:   Diagnosis Date    Allergic     Arthritis     Eczema     Hypertension     Other conditions influencing health status     No significant past medical history       No past surgical history on file.     reports that he has never smoked. He has never used smokeless tobacco. He reports current alcohol use of about 2.0 standard drinks of alcohol per week. He reports that he does not use drugs.    Review of Systems  Review of Systems   HENT:  Positive for facial swelling (right sided including lips). Negative for congestion, dental problem and ear pain.    Respiratory:  Positive for cough. Negative for shortness of breath, wheezing and stridor.    Cardiovascular:  Negative for chest pain.   All other systems reviewed and are negative.                                 Objective    Vitals:    02/25/25 1334   BP: (!) 152/92   BP Location: Left arm   Patient Position: Sitting   BP Cuff Size: Adult   Pulse: 85   Resp: 18   Temp: 36.4 °C (97.6 °F)   TempSrc: Temporal   SpO2: 98%   Weight: 70.3 kg (155 lb)     No LMP for male patient.    Physical Exam  Vitals and nursing note reviewed.   Constitutional:       Appearance: Normal appearance.   HENT:      Head: Normocephalic.        Right Ear: Tympanic membrane normal.      Left Ear: Tympanic membrane normal.      Nose: Nose normal.      Mouth/Throat:      Mouth: Mucous  membranes are moist.   Eyes:      Pupils: Pupils are equal, round, and reactive to light.   Cardiovascular:      Rate and Rhythm: Normal rate and regular rhythm.   Pulmonary:      Effort: Pulmonary effort is normal.      Breath sounds: Normal breath sounds.   Abdominal:      General: Bowel sounds are normal.      Palpations: Abdomen is soft.   Skin:     General: Skin is warm and dry.      Capillary Refill: Capillary refill takes less than 2 seconds.   Neurological:      General: No focal deficit present.      Mental Status: He is alert.   Psychiatric:         Mood and Affect: Mood normal.         Procedures    Point of Care Test & Imaging Results from this visit  No results found for this visit on 02/25/25.   No results found.    Diagnostic study results (if any) were reviewed by Crystal L Severino, APRN-CNP.    Assessment/Plan   Allergies, medications, history, and pertinent labs/EKGs/Imaging reviewed by Crystal L Severino, APRN-CNP.     Medical Decision Making  64-year-old male patient presents accompanied by his wife for right-sided facial swelling including upper and bottom lip.  Patient states last evening he purchased Mucinex DM 12-hour tablets along with Mucinex cough drops.  He states he took 2 cough drops yesterday and then again this morning he took another cough drops.  Patient states he went to get blood work drawn and then went to work, while at work he noticed hit the right side of his face began to swell and progressively worsened.  He is denying any shortness of breath, difficulty swallowing, difficulty breathing, scratchy throat.  Evaluation of in her mouth shows no signs of dental caries or decay and no wound appearance.  Patient denies any tenderness with palpation to the inner cheek or outer cheek.  Patient is given IM dose of Decadron.  Patient is discharged to home with a Medrol Dosepak and is instructed to stop taking the Mucinex both cough strep and tablets as this could be an allergic  reaction to the medication, he is also given instructions to go to his nearest emergency room or call 911 if any of the above symptoms should start, patient verbalizes understanding has no further questions    Orders and Diagnoses  Diagnoses and all orders for this visit:  Facial swelling  -     dexAMETHasone (Decadron) injection 10 mg      Medical Admin Record      Patient disposition: Home    Electronically signed by Crystal L Severino, APRN-CNP  1:44 PM       No

## 2025-02-25 NOTE — PATIENT INSTRUCTIONS
If you begin to have any sob, difficulty breathing or swallowing, throat tightness or your get progressively worse, you need to go to the ED or call 911

## 2025-02-26 LAB
ALBUMIN SERPL-MCNC: 4.1 G/DL (ref 3.6–5.1)
ALP SERPL-CCNC: 90 U/L (ref 35–144)
ALT SERPL-CCNC: 14 U/L (ref 9–46)
ANION GAP SERPL CALCULATED.4IONS-SCNC: 6 MMOL/L (CALC) (ref 7–17)
AST SERPL-CCNC: 16 U/L (ref 10–35)
BILIRUB SERPL-MCNC: 0.7 MG/DL (ref 0.2–1.2)
BUN SERPL-MCNC: 12 MG/DL (ref 7–25)
CALCIUM SERPL-MCNC: 8.8 MG/DL (ref 8.6–10.3)
CHLORIDE SERPL-SCNC: 103 MMOL/L (ref 98–110)
CHOLEST SERPL-MCNC: 119 MG/DL
CHOLEST/HDLC SERPL: 3.4 (CALC)
CO2 SERPL-SCNC: 30 MMOL/L (ref 20–32)
CREAT SERPL-MCNC: 0.8 MG/DL (ref 0.7–1.35)
EGFRCR SERPLBLD CKD-EPI 2021: 99 ML/MIN/1.73M2
ERYTHROCYTE [DISTWIDTH] IN BLOOD BY AUTOMATED COUNT: 12.4 % (ref 11–15)
GLUCOSE SERPL-MCNC: 98 MG/DL (ref 65–99)
HCT VFR BLD AUTO: 42 % (ref 38.5–50)
HDLC SERPL-MCNC: 35 MG/DL
HGB BLD-MCNC: 14.4 G/DL (ref 13.2–17.1)
LDLC SERPL CALC-MCNC: 68 MG/DL (CALC)
MCH RBC QN AUTO: 30.1 PG (ref 27–33)
MCHC RBC AUTO-ENTMCNC: 34.3 G/DL (ref 32–36)
MCV RBC AUTO: 87.7 FL (ref 80–100)
NONHDLC SERPL-MCNC: 84 MG/DL (CALC)
PLATELET # BLD AUTO: 303 THOUSAND/UL (ref 140–400)
PMV BLD REES-ECKER: 8.4 FL (ref 7.5–12.5)
POTASSIUM SERPL-SCNC: 4.2 MMOL/L (ref 3.5–5.3)
PROT SERPL-MCNC: 6.4 G/DL (ref 6.1–8.1)
RBC # BLD AUTO: 4.79 MILLION/UL (ref 4.2–5.8)
SODIUM SERPL-SCNC: 139 MMOL/L (ref 135–146)
TRIGL SERPL-MCNC: 80 MG/DL
TSH SERPL-ACNC: 1.22 MIU/L (ref 0.4–4.5)
VIT B12 SERPL-MCNC: 908 PG/ML (ref 200–1100)
WBC # BLD AUTO: 5.8 THOUSAND/UL (ref 3.8–10.8)

## 2025-02-28 ENCOUNTER — APPOINTMENT (OUTPATIENT)
Dept: PRIMARY CARE | Facility: CLINIC | Age: 65
End: 2025-02-28
Payer: MEDICARE

## 2025-02-28 VITALS
HEART RATE: 54 BPM | HEIGHT: 66 IN | WEIGHT: 155 LBS | DIASTOLIC BLOOD PRESSURE: 82 MMHG | SYSTOLIC BLOOD PRESSURE: 136 MMHG | BODY MASS INDEX: 24.91 KG/M2

## 2025-02-28 DIAGNOSIS — Z12.5 PROSTATE CANCER SCREENING: ICD-10-CM

## 2025-02-28 DIAGNOSIS — E78.2 MIXED HYPERLIPIDEMIA: ICD-10-CM

## 2025-02-28 DIAGNOSIS — Z12.11 COLON CANCER SCREENING: ICD-10-CM

## 2025-02-28 DIAGNOSIS — I10 BENIGN ESSENTIAL HYPERTENSION: ICD-10-CM

## 2025-02-28 DIAGNOSIS — R05.9 COUGH, UNSPECIFIED TYPE: ICD-10-CM

## 2025-02-28 DIAGNOSIS — M06.9 RHEUMATOID ARTHRITIS, INVOLVING UNSPECIFIED SITE, UNSPECIFIED WHETHER RHEUMATOID FACTOR PRESENT (MULTI): ICD-10-CM

## 2025-02-28 PROCEDURE — 1036F TOBACCO NON-USER: CPT | Performed by: CLINICAL NURSE SPECIALIST

## 2025-02-28 PROCEDURE — 99214 OFFICE O/P EST MOD 30 MIN: CPT | Performed by: CLINICAL NURSE SPECIALIST

## 2025-02-28 PROCEDURE — 3075F SYST BP GE 130 - 139MM HG: CPT | Performed by: CLINICAL NURSE SPECIALIST

## 2025-02-28 PROCEDURE — 3079F DIAST BP 80-89 MM HG: CPT | Performed by: CLINICAL NURSE SPECIALIST

## 2025-02-28 PROCEDURE — 3008F BODY MASS INDEX DOCD: CPT | Performed by: CLINICAL NURSE SPECIALIST

## 2025-02-28 RX ORDER — LISINOPRIL 40 MG/1
40 TABLET ORAL DAILY
Qty: 90 TABLET | Refills: 3 | Status: SHIPPED | OUTPATIENT
Start: 2025-02-28 | End: 2026-02-28

## 2025-02-28 ASSESSMENT — ENCOUNTER SYMPTOMS
ACTIVITY CHANGE: 0
CONFUSION: 0
BACK PAIN: 0
BLOOD IN STOOL: 0
BRUISES/BLEEDS EASILY: 0
FLANK PAIN: 0
FEVER: 0
TROUBLE SWALLOWING: 0
DEPRESSION: 0
DIZZINESS: 0
APPETITE CHANGE: 0
SLEEP DISTURBANCE: 0
JOINT SWELLING: 0
PHOTOPHOBIA: 0
CONSTIPATION: 0
UNEXPECTED WEIGHT CHANGE: 0
POLYDIPSIA: 0
MYALGIAS: 0
ABDOMINAL PAIN: 0
CHEST TIGHTNESS: 0
NECK PAIN: 0
LOSS OF SENSATION IN FEET: 0
EYE PAIN: 0
CHILLS: 0
OCCASIONAL FEELINGS OF UNSTEADINESS: 0
SHORTNESS OF BREATH: 0
SEIZURES: 0
HEADACHES: 0
FATIGUE: 0
PALPITATIONS: 0
VOMITING: 0
HEMATURIA: 0
WHEEZING: 0
NAUSEA: 0
SORE THROAT: 0
COUGH: 1
DYSURIA: 0
DIARRHEA: 0
WOUND: 0
ARTHRALGIAS: 0

## 2025-02-28 NOTE — PROGRESS NOTES
"Subjective   Patient ID: Dylon Louise is a 64 y.o. male who presents for Follow-up (Follow up/Discuss influenza A had it about 3 weeks ago ).  HPI    Here today as a follow up appointment. Blood pressure has been running around 130's over 70's-80's.      Following with Rheumatologist, Dr. Abraham. Had Vectra testing done, was recommended to take every other day. Diagnosed in 2011. Feels that his pain is controlled, improved \"achy joints.' Diagnosed with Gunn's Neuroma. Taking Ibuprofen OTC and well fitted shoes. Complaints of worsening pain in his right foot. Now following with Dr. Good for management, improved on Orthotics.      Taking Allegra, Flonase, and Ibuprofen PRN. Allergy testing in the past, indicated positive for Red Maple Pollen, worse with Dust Mites. Worse in the Spring/Fall. Otherwise, normally well controlled.      Has continued his Lisinopril for Hypertension. Monitoring blood pressure at home, controlled less than 140/90.      Has been trying a healthier diet, part meat/vegetarian diet. Diet/Exercise. B12. Men's Multivitamin to help.     Recently Influenza A, symptoms improving. Cough lingering.      Review of Systems   Constitutional:  Negative for activity change, appetite change, chills, fatigue, fever and unexpected weight change.   HENT:  Negative for ear pain, hearing loss, nosebleeds, sore throat, tinnitus and trouble swallowing.    Eyes:  Negative for photophobia, pain and visual disturbance.   Respiratory:  Positive for cough. Negative for chest tightness, shortness of breath and wheezing.    Cardiovascular:  Negative for chest pain, palpitations and leg swelling.   Gastrointestinal:  Negative for abdominal pain, blood in stool, constipation, diarrhea, nausea and vomiting.   Endocrine: Negative for cold intolerance, heat intolerance, polydipsia and polyuria.   Genitourinary:  Negative for dysuria, flank pain and hematuria.   Musculoskeletal:  Negative for arthralgias, back pain, joint " swelling, myalgias and neck pain.   Skin:  Negative for pallor, rash and wound.   Allergic/Immunologic: Negative for immunocompromised state.   Neurological:  Negative for dizziness, seizures and headaches.   Hematological:  Does not bruise/bleed easily.   Psychiatric/Behavioral:  Negative for confusion and sleep disturbance.        Objective   Physical Exam  Vitals and nursing note reviewed.   Constitutional:       General: He is not in acute distress.     Appearance: Normal appearance.   HENT:      Head: Normocephalic.      Nose: Nose normal.   Eyes:      Conjunctiva/sclera: Conjunctivae normal.   Neck:      Vascular: No carotid bruit.   Cardiovascular:      Rate and Rhythm: Normal rate and regular rhythm.      Pulses: Normal pulses.      Heart sounds: Normal heart sounds.   Pulmonary:      Effort: Pulmonary effort is normal.      Breath sounds: Normal breath sounds.   Abdominal:      General: Bowel sounds are normal.      Palpations: Abdomen is soft.   Musculoskeletal:         General: Normal range of motion.      Cervical back: Normal range of motion.   Skin:     General: Skin is warm and dry.   Neurological:      Mental Status: He is alert and oriented to person, place, and time. Mental status is at baseline.   Psychiatric:         Mood and Affect: Mood normal.         Behavior: Behavior normal.         Assessment/Plan        Reviewed results of blood work completed with patient.      Hypertension: Blood pressure borderline controlled at OV today. Working on dietary changes. Continue Lisinopril. Encourage ambulatory blood pressure monitoring. Reevaluate at follow up appointment.   Elevated LDL: CT Cardiac Scoring: June 2023, Score 0. Lifestyle changes as noted.   RA: Continue Plaquenil.  Following with Rheumatologist.   Allergies: Normally controlled with OTC medications. Continue to monitor.   Cough: Improving after Influenza. Recently started Medrol Dose Pack as prescribed by the Urgent Care.     Colonoscopy:  December 2024, plan to repeat in 10 years.  Prostate Screening: February 2025.   Tdap: June 2016.   Shingrix: September 2020, January 2021.   COVID: November 2023.   RSV Vaccine: Discussed.   Bone Density: November 2023, normal.   Wellness: August 2024.   Discussed updated immunizations.      Maggie Farrell, APRN-CNS 02/28/25 9:45 AM

## 2025-04-23 ENCOUNTER — APPOINTMENT (OUTPATIENT)
Dept: RHEUMATOLOGY | Facility: CLINIC | Age: 65
End: 2025-04-23
Payer: MEDICARE

## 2025-04-23 VITALS
SYSTOLIC BLOOD PRESSURE: 157 MMHG | DIASTOLIC BLOOD PRESSURE: 84 MMHG | HEART RATE: 60 BPM | OXYGEN SATURATION: 96 % | WEIGHT: 154 LBS | BODY MASS INDEX: 24.86 KG/M2

## 2025-04-23 DIAGNOSIS — M05.9 RHEUMATOID ARTHRITIS WITH POSITIVE RHEUMATOID FACTOR, INVOLVING UNSPECIFIED SITE (MULTI): Primary | ICD-10-CM

## 2025-04-23 DIAGNOSIS — Z79.899 LONG-TERM USE OF HYDROXYCHLOROQUINE: ICD-10-CM

## 2025-04-23 PROCEDURE — 99214 OFFICE O/P EST MOD 30 MIN: CPT | Performed by: INTERNAL MEDICINE

## 2025-04-23 PROCEDURE — 1036F TOBACCO NON-USER: CPT | Performed by: INTERNAL MEDICINE

## 2025-04-23 PROCEDURE — 3079F DIAST BP 80-89 MM HG: CPT | Performed by: INTERNAL MEDICINE

## 2025-04-23 PROCEDURE — 3077F SYST BP >= 140 MM HG: CPT | Performed by: INTERNAL MEDICINE

## 2025-04-23 RX ORDER — HYDROXYCHLOROQUINE SULFATE 200 MG/1
200 TABLET, FILM COATED ORAL EVERY OTHER DAY
Qty: 45 TABLET | Refills: 1 | Status: SHIPPED | OUTPATIENT
Start: 2025-04-23 | End: 2025-07-22

## 2025-04-23 NOTE — PROGRESS NOTES
Follow-up Rheumatology Patient Visit    Chief Complaint:  Dylon Louise is a 64 y.o. male presenting today for Follow-up (fuv).    History of Presenting Problem:   65 y/o male with Hx of seropositive RA diagnosed in 2011 on PLQ present for evaluation. He recalled having a lot of swelling in his hands/feet and fatigue. He was found to have +CC and RF. He was started on PLQ and he responded very well. Since has been on Plaquenil he has minimal joint complaints he has very few joint exacerbations, at last visit he was interested in discussing whether he is in disease remission.  Vectra score March 2023 was 21,   Today patient presents for follow-up he is still on   mg every other day.  He has no acute joint pain feet has been doing well with inserts.  He is keeping up with his eye exam with no signs of Plaquenil toxicity      Problem List:   Patient Active Problem List   Diagnosis    Allergic rhinitis    Benign essential hypertension    Eczema    Hemorrhoids    Hyperlipemia    Low-density-lipoid-type (LDL) hyperlipoproteinemia    Nocturia    Rheumatoid arthritis    Seasonal allergies    Vitiligo    Neck pain    Healthcare maintenance       Past Medical History:   Past Medical History:   Diagnosis Date    Allergic     Arthritis     Eczema     Hypertension     Other conditions influencing health status     No significant past medical history       Surgical History: No past surgical history on file.     Allergies:   Allergies   Allergen Reactions    Azithromycin Unknown    Pollen Extracts Unknown    Tobramycin Other and Rash       Medications:   Current Outpatient Medications:     fluticasone (Flonase) 50 mcg/actuation nasal spray, Administer 2 sprays into each nostril once daily., Disp: , Rfl:     lisinopril 40 mg tablet, Take 1 tablet (40 mg) by mouth once daily., Disp: 90 tablet, Rfl: 3    mv-mn/folic/lutein/herbal 293 (ALIVE MEN'S 50 PLUS MULTIVIT ORAL), Take by mouth., Disp: , Rfl:     vit  C/E/cuperic/zinc/lutein (PRESERVISION LUTEIN ORAL), Take by mouth., Disp: , Rfl:     hydroxychloroquine (Plaquenil) 200 mg tablet, Take 1 tablet (200 mg) by mouth every other day., Disp: 45 tablet, Rfl: 1    methylPREDNISolone (Medrol Dospak) 4 mg tablets, Take as directed on package. (Patient not taking: Reported on 4/23/2025), Disp: 21 tablet, Rfl: 0      Objective   Physical Examination:    Visit Vitals  /84 (BP Location: Left arm, Patient Position: Sitting, BP Cuff Size: Adult)   Pulse 60   Wt 69.9 kg (154 lb)   SpO2 96%   BMI 24.86 kg/m²   Smoking Status Never   BSA 1.8 m²        Gen: NAD, A&O x 3  HEENT: clear sclera and conjunctiva,     Musculoskeletal:   Neck; WNL, full ROM  Shoulder: WNL, full ROM  Elbow:WNL, full ROM, no effusion noted  Wrist and fingers;no active synovitis noted, Full ROM in the Wrist , Good fist and   Knees:  No effusions or crepitation, full ROM.  Hips; WNL, full ROM, Negative Parker test  Ankle, Feet; WNL, full ROM    Skin: No rashes or lesions seen, no nail changes  Neuro: A&O x3, Normal Gait    Procedures :None    Orders:  Orders Placed This Encounter   Procedures    Sedimentation Rate    Vitamin D 25-Hydroxy,Total (for eval of Vitamin D levels)    C-Reactive Protein    CBC and Auto Differential    Creatinine        Provider Impression:   Assessment/Plan   Encounter Diagnoses   Name Primary?    Rheumatoid arthritis with positive rheumatoid factor, involving unspecified site (Multi) Yes    Long-term use of hydroxychloroquine        63 y/o male with Hx of seropositive RA diagnosed in 2011 on PLQ present for evaluation. Patient reports he has minimal disease activity on chronic hydroxychloroquine therapy and is interested in weaning off therapy. We discussed the risk and benefit of treatment and also going treatment.  -Conitnue with PLQ  every other day, patient is up-to-date with retinal eye exam  -Recent DEXA Nov 2023 show normal bone density,  lowest score is -0.6.  Recommend calcium 1000 and Vitamin d 2000 units supplement.   Will plan to repeat bone density at next visit  -reviewed recent labs, additional monitoring labs  -Follow-up in 8 months

## 2025-05-08 ENCOUNTER — PATIENT MESSAGE (OUTPATIENT)
Dept: PRIMARY CARE | Facility: CLINIC | Age: 65
End: 2025-05-08
Payer: MEDICARE

## 2025-05-08 DIAGNOSIS — I10 BENIGN ESSENTIAL HYPERTENSION: ICD-10-CM

## 2025-05-08 DIAGNOSIS — E78.2 MIXED HYPERLIPIDEMIA: ICD-10-CM

## 2025-05-08 RX ORDER — LISINOPRIL 40 MG/1
40 TABLET ORAL DAILY
Qty: 90 TABLET | Refills: 3 | Status: SHIPPED | OUTPATIENT
Start: 2025-05-08 | End: 2026-05-08

## 2025-08-06 ENCOUNTER — PATIENT MESSAGE (OUTPATIENT)
Dept: PRIMARY CARE | Facility: CLINIC | Age: 65
End: 2025-08-06
Payer: MEDICARE

## 2025-08-06 DIAGNOSIS — I10 BENIGN ESSENTIAL HYPERTENSION: Primary | ICD-10-CM

## 2025-08-06 RX ORDER — LISINOPRIL AND HYDROCHLOROTHIAZIDE 12.5; 2 MG/1; MG/1
1 TABLET ORAL DAILY
Qty: 100 TABLET | Refills: 3 | Status: SHIPPED | OUTPATIENT
Start: 2025-08-06 | End: 2025-08-06 | Stop reason: SDUPTHER

## 2025-08-06 RX ORDER — LISINOPRIL AND HYDROCHLOROTHIAZIDE 12.5; 2 MG/1; MG/1
1 TABLET ORAL DAILY
Qty: 100 TABLET | Refills: 3 | Status: SHIPPED | OUTPATIENT
Start: 2025-08-06 | End: 2026-09-10

## 2025-08-26 DIAGNOSIS — M05.9 RHEUMATOID ARTHRITIS WITH POSITIVE RHEUMATOID FACTOR, INVOLVING UNSPECIFIED SITE (MULTI): ICD-10-CM

## 2025-08-26 RX ORDER — HYDROXYCHLOROQUINE SULFATE 200 MG/1
400 TABLET, FILM COATED ORAL DAILY
Qty: 180 TABLET | Refills: 0 | Status: SHIPPED | OUTPATIENT
Start: 2025-08-26 | End: 2026-02-22

## 2025-08-26 ASSESSMENT — PROMIS GLOBAL HEALTH SCALE
RATE_SOCIAL_SATISFACTION: GOOD
RATE_AVERAGE_PAIN: 1
RATE_PHYSICAL_HEALTH: GOOD
CARRYOUT_SOCIAL_ACTIVITIES: VERY GOOD
CARRYOUT_PHYSICAL_ACTIVITIES: COMPLETELY
RATE_AVERAGE_FATIGUE: MILD
EMOTIONAL_PROBLEMS: RARELY
RATE_GENERAL_HEALTH: VERY GOOD
RATE_QUALITY_OF_LIFE: VERY GOOD
RATE_MENTAL_HEALTH: VERY GOOD

## 2025-08-28 DIAGNOSIS — Z12.5 PROSTATE CANCER SCREENING: ICD-10-CM

## 2025-08-28 DIAGNOSIS — M06.9 RHEUMATOID ARTHRITIS, INVOLVING UNSPECIFIED SITE, UNSPECIFIED WHETHER RHEUMATOID FACTOR PRESENT (MULTI): ICD-10-CM

## 2025-08-28 DIAGNOSIS — I10 BENIGN ESSENTIAL HYPERTENSION: ICD-10-CM

## 2025-08-28 DIAGNOSIS — R05.9 COUGH, UNSPECIFIED TYPE: ICD-10-CM

## 2025-08-28 DIAGNOSIS — Z12.11 COLON CANCER SCREENING: ICD-10-CM

## 2025-08-28 DIAGNOSIS — E78.2 MIXED HYPERLIPIDEMIA: ICD-10-CM

## 2025-08-28 LAB
ALBUMIN SERPL-MCNC: 4.4 G/DL (ref 3.6–5.1)
ALP SERPL-CCNC: 82 U/L (ref 35–144)
ALT SERPL-CCNC: 24 U/L (ref 9–46)
ANION GAP SERPL CALCULATED.4IONS-SCNC: 9 MMOL/L (CALC) (ref 7–17)
AST SERPL-CCNC: 26 U/L (ref 10–35)
BILIRUB SERPL-MCNC: 0.6 MG/DL (ref 0.2–1.2)
BUN SERPL-MCNC: 15 MG/DL (ref 7–25)
CALCIUM SERPL-MCNC: 9.2 MG/DL (ref 8.6–10.3)
CHLORIDE SERPL-SCNC: 103 MMOL/L (ref 98–110)
CHOLEST SERPL-MCNC: 183 MG/DL
CHOLEST/HDLC SERPL: 4.6 (CALC)
CO2 SERPL-SCNC: 31 MMOL/L (ref 20–32)
CREAT SERPL-MCNC: 0.92 MG/DL (ref 0.7–1.35)
EGFRCR SERPLBLD CKD-EPI 2021: 92 ML/MIN/1.73M2
ERYTHROCYTE [DISTWIDTH] IN BLOOD BY AUTOMATED COUNT: 13.1 % (ref 11–15)
GLUCOSE SERPL-MCNC: 96 MG/DL (ref 65–99)
HCT VFR BLD AUTO: 45 % (ref 38.5–50)
HDLC SERPL-MCNC: 40 MG/DL
HGB BLD-MCNC: 14.9 G/DL (ref 13.2–17.1)
HIV 1+2 AB+HIV1 P24 AG SERPL QL IA: NORMAL
HIV 1+2 AB+HIV1 P24 AG SERPL QL IA: NORMAL
LDLC SERPL CALC-MCNC: 111 MG/DL (CALC)
MCH RBC QN AUTO: 30.2 PG (ref 27–33)
MCHC RBC AUTO-ENTMCNC: 33.1 G/DL (ref 32–36)
MCV RBC AUTO: 91.1 FL (ref 80–100)
NONHDLC SERPL-MCNC: 143 MG/DL (CALC)
PLATELET # BLD AUTO: 173 THOUSAND/UL (ref 140–400)
PMV BLD REES-ECKER: 8.7 FL (ref 7.5–12.5)
POTASSIUM SERPL-SCNC: 3.8 MMOL/L (ref 3.5–5.3)
PROT SERPL-MCNC: 6.5 G/DL (ref 6.1–8.1)
RBC # BLD AUTO: 4.94 MILLION/UL (ref 4.2–5.8)
SODIUM SERPL-SCNC: 143 MMOL/L (ref 135–146)
TRIGL SERPL-MCNC: 196 MG/DL
TSH SERPL-ACNC: 2.55 MIU/L (ref 0.4–4.5)
VIT B12 SERPL-MCNC: 669 PG/ML (ref 200–1100)
WBC # BLD AUTO: 4.2 THOUSAND/UL (ref 3.8–10.8)

## 2025-08-29 ENCOUNTER — APPOINTMENT (OUTPATIENT)
Dept: PRIMARY CARE | Facility: CLINIC | Age: 65
End: 2025-08-29
Payer: MEDICARE

## 2025-08-29 VITALS
DIASTOLIC BLOOD PRESSURE: 75 MMHG | BODY MASS INDEX: 25.39 KG/M2 | SYSTOLIC BLOOD PRESSURE: 161 MMHG | WEIGHT: 158 LBS | HEIGHT: 66 IN | OXYGEN SATURATION: 98 % | HEART RATE: 62 BPM

## 2025-08-29 DIAGNOSIS — Z12.5 PROSTATE CANCER SCREENING: ICD-10-CM

## 2025-08-29 DIAGNOSIS — E78.2 MIXED HYPERLIPIDEMIA: ICD-10-CM

## 2025-08-29 DIAGNOSIS — Z12.11 COLON CANCER SCREENING: ICD-10-CM

## 2025-08-29 DIAGNOSIS — I10 BENIGN ESSENTIAL HYPERTENSION: ICD-10-CM

## 2025-08-29 DIAGNOSIS — H61.23 BILATERAL IMPACTED CERUMEN: Primary | ICD-10-CM

## 2025-08-29 ASSESSMENT — ENCOUNTER SYMPTOMS
EYE PAIN: 0
DYSURIA: 0
CHEST TIGHTNESS: 0
SHORTNESS OF BREATH: 0
COUGH: 0
WHEEZING: 0
ABDOMINAL PAIN: 0
OCCASIONAL FEELINGS OF UNSTEADINESS: 0
DIZZINESS: 0
BRUISES/BLEEDS EASILY: 0
JOINT SWELLING: 0
BLOOD IN STOOL: 0
NECK PAIN: 0
FEVER: 0
HEADACHES: 0
CONSTIPATION: 0
APPETITE CHANGE: 0
PALPITATIONS: 0
SLEEP DISTURBANCE: 0
LOSS OF SENSATION IN FEET: 0
ACTIVITY CHANGE: 0
FLANK PAIN: 0
CHILLS: 0
MYALGIAS: 0
NAUSEA: 0
PHOTOPHOBIA: 0
FATIGUE: 0
TROUBLE SWALLOWING: 0
BACK PAIN: 0
WOUND: 0
ARTHRALGIAS: 0
VOMITING: 0
UNEXPECTED WEIGHT CHANGE: 0
DIARRHEA: 0
DEPRESSION: 0
HEMATURIA: 0
POLYDIPSIA: 0
CONFUSION: 0
SEIZURES: 0
SORE THROAT: 0

## 2025-08-29 ASSESSMENT — PATIENT HEALTH QUESTIONNAIRE - PHQ9
2. FEELING DOWN, DEPRESSED OR HOPELESS: NOT AT ALL
1. LITTLE INTEREST OR PLEASURE IN DOING THINGS: NOT AT ALL
SUM OF ALL RESPONSES TO PHQ9 QUESTIONS 1 AND 2: 0

## 2025-12-30 ENCOUNTER — APPOINTMENT (OUTPATIENT)
Dept: RHEUMATOLOGY | Facility: CLINIC | Age: 65
End: 2025-12-30
Payer: MEDICARE

## 2026-03-19 ENCOUNTER — APPOINTMENT (OUTPATIENT)
Dept: PRIMARY CARE | Facility: CLINIC | Age: 66
End: 2026-03-19
Payer: MEDICARE